# Patient Record
Sex: FEMALE | Race: WHITE | NOT HISPANIC OR LATINO | Employment: OTHER | URBAN - METROPOLITAN AREA
[De-identification: names, ages, dates, MRNs, and addresses within clinical notes are randomized per-mention and may not be internally consistent; named-entity substitution may affect disease eponyms.]

---

## 2017-01-04 ENCOUNTER — ROUTINE PRENATAL (OUTPATIENT)
Dept: OBGYN | Facility: CLINIC | Age: 22
End: 2017-01-04
Payer: COMMERCIAL

## 2017-01-04 VITALS — BODY MASS INDEX: 30.65 KG/M2 | SYSTOLIC BLOOD PRESSURE: 110 MMHG | WEIGHT: 173 LBS | DIASTOLIC BLOOD PRESSURE: 70 MMHG

## 2017-01-04 DIAGNOSIS — O34.219 PREVIOUS CESAREAN DELIVERY, ANTEPARTUM: ICD-10-CM

## 2017-01-04 PROCEDURE — 90040 PR PRENATAL FOLLOW UP: CPT | Performed by: OBSTETRICS & GYNECOLOGY

## 2017-01-04 NOTE — MR AVS SNAPSHOT
Rowan Leos   2017 1:00 PM   Routine Prenatal   MRN: [id number]    Department:  [facility]   Dept Phone:  [phone]    Description:  Female : 1995   Provider:  Linda Messer M.D.           Allergies as of 2017     Allergen Noted Reactions    Ortho Tri-Cyclen [Norgestimate-Ethinyl Estradiol] 2010   Anaphylaxis    Ortho tri-cyclen lo      You were diagnosed with     Previous  delivery, antepartum   [[id number]]         Vital Signs     Blood Pressure Weight Smoking Status             110/70 mmHg 78.472 kg (173 lb) Former Smoker         Basic Information     Date Of Birth Sex Race Ethnicity Preferred Language    1995 Female White Non- English      Your appointments     2017  1:00 PM   OB Follow Up with Linda Messer M.D.   Formerly [facility] ([address])    [address] [place] [address] [id number] [phone]            2017  1:00 PM   OB Follow Up with Linda Messer M.D.   Formerly [facility] ([address])    [address] [place] [id number] [phone]            2017  1:00 PM   OB Follow Up with Linda Messer M.D.   Former[facility] ([address])    [address] [place] [address] [phone]            2017  1:00 PM   OB Follow Up with Linda Messer M.D.   Former[facility] ([address])    [address] [place] [id number] [phone]            Mar 03, 2017  1:00 PM   OB Follow Up with Linda Messer M.D.   Former[facility] ([address])    [address] [place] [address], [address] [place] [address] [id number] [phone]              Problem List              ICD-10-CM Priority Class Noted - Resolved    Fibromyalgia M79.7   9/3/2014 - Present    Arthritis M19.90   9/3/2014 - Present    Hx of migraine headaches Z86.69   9/3/2014 - Present    Previous  delivery,  antepartum O34.219   10/17/2016 - Present      Health Maintenance        Date Due Completion Dates    IMM HEP B VACCINE (1 of 3 - Primary Series) 1995 ---    IMM HEP A VACCINE (1 of 2 - Standard Series) 7/30/1994 ---    IMM HPV VACCINE (1 of 3 - Female 3 Dose Series) 7/30/2004 ---    IMM VARICELLA (CHICKENPOX) VACCINE (1 of 2 - 2 Dose Adolescent Series) 7/30/2006 ---    IMM INFLUENZA (1) 9/1/2016 ---    PAP SMEAR 3/25/2019 3/25/2016, 3/25/2016 (Done), 2/7/2014    Override on 3/25/2016: Done    IMM DTaP/Tdap/Td Vaccine (3 - Td) 12/13/2026 12/13/2016, 9/14/2014            Current Immunizations     MMR/Varicella Combined Vaccine  Incomplete    Tdap Vaccine 12/13/2016,  Incomplete, 9/14/2014 11:30 PM      Below and/or attached are the medications your provider expects you to take. Review all of your home medications and newly ordered medications with your provider and/or pharmacist. Follow medication instructions as directed by your provider and/or pharmacist. Please keep your medication list with you and share with your provider. Update the information when medications are discontinued, doses are changed, or new medications (including over-the-counter products) are added; and carry medication information at all times in the event of emergency situations     Allergies:  ORTHO TRI-CYCLEN - Anaphylaxis               Medications  Valid as of: January 04, 2017 -  1:17 PM    Generic Name Brand Name Tablet Size Instructions for use    Docosahexaenoic Acid   Take  by mouth.        Docusate Sodium (Cap)  MG Take 100 mg by mouth 2 times a day as needed for Constipation.        Ibuprofen (Tab) MOTRIN 800 MG Take 1 Tab by mouth every 8 hours as needed (Cramping).        Norethindrone-Eth Estradiol (Tab) ORTHO-NOVUM 1-35 TAB, NORTREL 1-35 TAB 1-35 MG-MCG Take 1 Tab by mouth every day.        Oxycodone-Acetaminophen (Tab) PERCOCET 5-325 MG Take 1 Tab by mouth every four hours as needed for Moderate Pain ((Pain Scale  4-6)).        Prenatal MV-Min-Fe Fum-FA-DHA   Take  by mouth.        Simethicone (Chew Tab) MYLICON 80 MG Take 1 Tab by mouth 4 times a day as needed (Abdominal Distention).        .                 Medicines prescribed today were sent to:     [id number] [place], [place] - [address]    [address] [facility] [place] [address]    Phone: [phone] Fax: [phone]    Open 24 Hours?: No      Medication refill instructions:       If your prescription bottle indicates you have medication refills left, it is not necessary to call your provider’s office. Please contact your pharmacy and they will refill your medication.    If your prescription bottle indicates you do not have any refills left, you may request refills at any time through one of the following ways: The online Tribzi system (except Urgent Care), by calling your provider’s office, or by asking your pharmacy to contact your provider’s office with a refill request. Medication refills are processed only during regular business hours and may not be available until the next business day. Your provider may request additional information or to have a follow-up visit with you prior to refilling your medication.   *Please Note: Medication refills are assigned a new Rx number when refilled electronically. Your pharmacy may indicate that no refills were authorized even though a new prescription for the same medication is available at the pharmacy. Please request the medicine by name with the pharmacy before contacting your provider for a refill.           Tribzi Access Code: [email or web address]  Expires: 2/3/2017  1:17 PM    Tribzi  A secure, online tool to manage your health information     [employer]’s Tribzi® is a secure, online tool that connects you to your personalized health information from the privacy of your home -- day or night - making it very easy for you to manage your healthcare. Once the activation process is completed, you can even  access your medical information using the [employer] damian, which is available for free in the Apple Damian store or Google Play store.     [employer] provides the following levels of access (as shown below):   My Chart Features   [facility] Primary [facility] Doctor [facility]  Specialists [facility]  Urgent  Care Non-[facility]  Primary Care  Doctor   Email your healthcare team securely and privately 24/7 X X X    Manage appointments: schedule your next appointment; view details of past/upcoming appointments X      Request prescription refills. X      View recent personal medical records, including lab and immunizations X X X X   View health record, including health history, allergies, medications X X X X   Read reports about your outpatient visits, procedures, consult and ER notes X X X X   See your discharge summary, which is a recap of your hospital and/or ER visit that includes your diagnosis, lab results, and care plan. X X       How to register for [employer]:  1. Go to  [email or web address].  2. Click on the Sign Up Now box, which takes you to the New Member Sign Up page. You will need to provide the following information:  a. Enter your [employer] Access Code exactly as it appears at the top of this page. (You will not need to use this code after you’ve completed the sign-up process. If you do not sign up before the expiration date, you must request a new code.)   b. Enter your date of birth.   c. Enter your home email address.   d. Click Submit, and follow the next screen’s instructions.  3. Create a [employer] ID. This will be your [employer] login ID and cannot be changed, so think of one that is secure and easy to remember.  4. Create a [employer] password. You can change your password at any time.  5. Enter your Password Reset Question and Answer. This can be used at a later time if you forget your password.   6. Enter your e-mail address. This allows you to receive e-mail notifications when new information is available in [employer].  7. Click  Sign Up. You can now view your health information.    For assistance activating your [employer] account, call [phone]

## 2017-01-04 NOTE — PROGRESS NOTES
21 y.o.  31w6d The patient is here for routine obstetrical followup. She is without complaints and reports good fetal movement. She denies contractions, vaginal bleeding, or leaking of fluid.  She has recently recovered from the flu.     The patient's pregnancy is complicated by   Patient Active Problem List    Diagnosis Date Noted   • Previous  delivery, antepartum 10/17/2016   • Fibromyalgia 2014   • Arthritis 2014   • Hx of migraine headaches 2014     Did not do 28 week labs - urged compliance.     Followup in 2 weeks   labor precautions were discussed with patient  Fetal kick counts were discussed with patient

## 2017-01-06 ENCOUNTER — HOSPITAL ENCOUNTER (OUTPATIENT)
Dept: LAB | Facility: MEDICAL CENTER | Age: 22
End: 2017-01-06
Attending: OBSTETRICS & GYNECOLOGY
Payer: COMMERCIAL

## 2017-01-06 DIAGNOSIS — O09.622 HIGH RISK PREGNANCY IN YOUNG MULTIGRAVIDA, SECOND TRIMESTER: ICD-10-CM

## 2017-01-06 LAB — GLUCOSE 1H P 50 G GLC PO SERPL-MCNC: 129 MG/DL (ref 70–139)

## 2017-01-06 PROCEDURE — 36415 COLL VENOUS BLD VENIPUNCTURE: CPT

## 2017-01-06 PROCEDURE — 82950 GLUCOSE TEST: CPT

## 2017-01-19 ENCOUNTER — ROUTINE PRENATAL (OUTPATIENT)
Dept: OBGYN | Facility: CLINIC | Age: 22
End: 2017-01-19
Payer: COMMERCIAL

## 2017-01-19 VITALS — SYSTOLIC BLOOD PRESSURE: 110 MMHG | BODY MASS INDEX: 31.54 KG/M2 | DIASTOLIC BLOOD PRESSURE: 70 MMHG | WEIGHT: 178 LBS

## 2017-01-19 DIAGNOSIS — O34.219 PREVIOUS CESAREAN DELIVERY, ANTEPARTUM: ICD-10-CM

## 2017-01-19 PROCEDURE — 90040 PR PRENATAL FOLLOW UP: CPT | Performed by: OBSTETRICS & GYNECOLOGY

## 2017-01-19 NOTE — PROGRESS NOTES
21 y.o.  34w0d The patient is here for routine obstetrical followup. She is without complaints and reports good fetal movement. She denies contractions, vaginal bleeding, or leaking of fluid.    The patient's pregnancy is complicated by   Patient Active Problem List    Diagnosis Date Noted   • Previous  delivery, antepartum 10/17/2016   • Fibromyalgia 2014   • Arthritis 2014   • Hx of migraine headaches 2014     28 wk labs normal.  CF testing normal.    Reviewed policy for , that we would allow her to go into labor spontaneously until 40 wks, then schedule repeat C/S if no spontaneous labor.  She is resistant to this.  I explained the risk of failed  after 40 wks and increased risk of uterine rupture.      Followup in 2 weeks  Labor precautions were discussed with patient  Fetal kick counts were discussed with patient

## 2017-01-19 NOTE — MR AVS SNAPSHOT
Rowan Leos   2017 1:00 PM   Routine Prenatal   MRN: [id number]    Department:  [facility]   Dept Phone:  [phone]    Description:  Female : 1995   Provider:  Linda Messer M.D.           Allergies as of 2017     Allergen Noted Reactions    Ortho Tri-Cyclen [Norgestimate-Ethinyl Estradiol] 2010   Anaphylaxis    Ortho tri-cyclen lo      Vital Signs     Blood Pressure Weight Smoking Status             110/70 mmHg 80.74 kg (178 lb) Former Smoker         Basic Information     Date Of Birth Sex Race Ethnicity Preferred Language    1995 Female White Non- English      Your appointments     2017  1:00 PM   OB Follow Up with Linda Messer M.D.   [address])    [address] [place] [id number] [phone]            2017  1:00 PM   OB Follow Up with Linda Messer M.D.   [facility] ([address])    [address] [place] [phone]            2017  1:00 PM   OB Follow Up with Linda Messer M.D.   [facility] ([address])    [address] [place] [phone]            Mar 03, 2017  1:00 PM   OB Follow Up with Linda Messer M.D.   [address] [phone]              Problem List              ICD-10-CM Priority Class Noted - Resolved    Fibromyalgia M79.7   9/3/2014 - Present    Arthritis M19.90   9/3/2014 - Present    Hx of migraine headaches Z86.69   9/3/2014 - Present    Previous  delivery, antepartum O34.219   10/17/2016 - Present      Health Maintenance        Date Due Completion Dates    IMM HEP B VACCINE (1 of 3 - Primary Series) 1995 ---    IMM HEP A VACCINE (1 of 2 - Standard Series) 1994 ---    IMM HPV VACCINE (1 of 3 - Female 3 Dose Series) 2004 ---      IMM VARICELLA (CHICKENPOX) VACCINE (1 of 2 - 2 Dose Adolescent Series) 7/30/2006 ---    IMM INFLUENZA (1) 9/1/2016 ---    PAP SMEAR 3/25/2019 3/25/2016, 3/25/2016 (Done), 2/7/2014    Override on 3/25/2016: Done    IMM DTaP/Tdap/Td Vaccine (3 - Td) 12/13/2026 12/13/2016, 9/14/2014            Current Immunizations     MMR/Varicella Combined Vaccine  Incomplete    Tdap Vaccine 12/13/2016,  Incomplete, 9/14/2014 11:30 PM      Below and/or attached are the medications your provider expects you to take. Review all of your home medications and newly ordered medications with your provider and/or pharmacist. Follow medication instructions as directed by your provider and/or pharmacist. Please keep your medication list with you and share with your provider. Update the information when medications are discontinued, doses are changed, or new medications (including over-the-counter products) are added; and carry medication information at all times in the event of emergency situations     Allergies:  ORTHO TRI-CYCLEN - Anaphylaxis               Medications  Valid as of: January 19, 2017 -  1:19 PM    Generic Name Brand Name Tablet Size Instructions for use    Docosahexaenoic Acid   Take  by mouth.        Docusate Sodium (Cap)  MG Take 100 mg by mouth 2 times a day as needed for Constipation.        Ibuprofen (Tab) MOTRIN 800 MG Take 1 Tab by mouth every 8 hours as needed (Cramping).        Norethindrone-Eth Estradiol (Tab) ORTHO-NOVUM 1-35 TAB, NORTREL 1-35 TAB 1-35 MG-MCG Take 1 Tab by mouth every day.        Oxycodone-Acetaminophen (Tab) PERCOCET 5-325 MG Take 1 Tab by mouth every four hours as needed for Moderate Pain ((Pain Scale 4-6)).        Prenatal MV-Min-Fe Fum-FA-DHA   Take  by mouth.        Simethicone (Chew Tab) MYLICON 80 MG Take 1 Tab by mouth 4 times a day as needed (Abdominal Distention).        .                 Medicines prescribed today were sent to:     [id number] [place] - [address]    [address] [place] [address]    Phone: [phone] Fax: [phone]    Open 24 Hours?: No      Medication refill instructions:       If your prescription bottle indicates you have medication refills left, it is not necessary to call your provider’s office. Please contact your pharmacy and they will refill your medication.    If your prescription bottle indicates you do not have any refills left, you may request refills at any time through one of the following ways: The online Harimata system (except Urgent Care), by calling your provider’s office, or by asking your pharmacy to contact your provider’s office with a refill request. Medication refills are processed only during regular business hours and may not be available until the next business day. Your provider may request additional information or to have a follow-up visit with you prior to refilling your medication.   *Please Note: Medication refills are assigned a new Rx number when refilled electronically. Your pharmacy may indicate that no refills were authorized even though a new prescription for the same medication is available at the pharmacy. Please request the medicine by name with the pharmacy before contacting your provider for a refill.           Harimata Access Code: [email or web address]  Expires: 2/3/2017  1:17 PM    Harimata  A secure, online tool to manage your health information     [employer]’s Harimata® is a secure, online tool that connects you to your personalized health information from the privacy of your home -- day or night - making it very easy for you to manage your healthcare. Once the activation process is completed, you can even access your medical information using the Harimata damian, which is available for free in the Apple Damian store or Google Play store.     Harimata provides the following levels of access (as shown below):   My Chart Features   [facility] Primary [facility] Doctor [facility]  Specialists [facility]  Urgent  Care  [facility]  Primary Care  Doctor   Email your healthcare team securely and privately 24/7 X X X    Manage appointments: schedule your next appointment; view details of past/upcoming appointments X      Request prescription refills. X      View recent personal medical records, including lab and immunizations X X X X   View health record, including health history, allergies, medications X X X X   Read reports about your outpatient visits, procedures, consult and ER notes X X X X   See your discharge summary, which is a recap of your hospital and/or ER visit that includes your diagnosis, lab results, and care plan. X X       How to register for crealytics:  1. Go to  [email or web address].  2. Click on the Sign Up Now box, which takes you to the New Member Sign Up page. You will need to provide the following information:  a. Enter your crealytics Access Code exactly as it appears at the top of this page. (You will not need to use this code after you’ve completed the sign-up process. If you do not sign up before the expiration date, you must request a new code.)   b. Enter your date of birth.   c. Enter your home email address.   d. Click Submit, and follow the next screen’s instructions.  3. Create a crealytics ID. This will be your crealytics login ID and cannot be changed, so think of one that is secure and easy to remember.  4. Create a crealytics password. You can change your password at any time.  5. Enter your Password Reset Question and Answer. This can be used at a later time if you forget your password.   6. Enter your e-mail address. This allows you to receive e-mail notifications when new information is available in crealytics.  7. Click Sign Up. You can now view your health information.    For assistance activating your crealytics account, call [phone]

## 2017-02-01 ENCOUNTER — ROUTINE PRENATAL (OUTPATIENT)
Dept: OBGYN | Facility: CLINIC | Age: 22
End: 2017-02-01
Payer: COMMERCIAL

## 2017-02-01 ENCOUNTER — HOSPITAL ENCOUNTER (OUTPATIENT)
Facility: MEDICAL CENTER | Age: 22
End: 2017-02-01
Attending: OBSTETRICS & GYNECOLOGY
Payer: COMMERCIAL

## 2017-02-01 VITALS — WEIGHT: 179 LBS | BODY MASS INDEX: 31.72 KG/M2 | SYSTOLIC BLOOD PRESSURE: 112 MMHG | DIASTOLIC BLOOD PRESSURE: 68 MMHG

## 2017-02-01 DIAGNOSIS — Z34.83 PRENATAL CARE, SUBSEQUENT PREGNANCY, THIRD TRIMESTER: ICD-10-CM

## 2017-02-01 PROCEDURE — 90040 PR PRENATAL FOLLOW UP: CPT | Performed by: OBSTETRICS & GYNECOLOGY

## 2017-02-01 PROCEDURE — 87653 STREP B DNA AMP PROBE: CPT

## 2017-02-01 NOTE — PROGRESS NOTES
Patient is at 35w6d .no complaints  Fetal Movement : positive       Patients' weight gain, fluid intake and exercise level discussed.Vitals, fundal height , fetal position, and FHR reviewed on flowsheet.    .../68 mmHg  Wt 81.194 kg (179 lb)  Past Medical History   Diagnosis Date   • Fibromyalgia    • Arthritis    • Headache, classical migraine    • Migraines      Patient Active Problem List    Diagnosis Date Noted   • Previous  delivery, antepartum 10/17/2016   • Fibromyalgia 2014   • Arthritis 2014   • Hx of migraine headaches 2014         Lab:No results found for this or any previous visit (from the past 336 hour(s)).  Review of Op note: 5cm , late decelerations , Low transverse , single layer only   Assessment:  1  35w6d  2. . Doing well  3. Size equals Dates and/or Scan  4. Weight gain: normal: Yes, excessive:No  5. Total gain at 29 lbs                         Plan.  1. Rediscuss diet.  2. Increase water intake PRN  3. Continue vitamins.  4. Kick counts as instructed.  5. Discuss support hose and proper shoe wear as indicated.  Extensive discussionregarding policy for TOL[employer]. Qualifications , and limitations. Risks and benefits as well discussed. Explained need to set standards , specifically , that patients whom are distant fromdelivery at time of NICK, will be required to have Repat C/S and reasoning behind this . Patient made aware that significant cervical change , signs of labor , etc, will allow few days [address] PRN   PAtient seem edf to understand this

## 2017-02-01 NOTE — MR AVS SNAPSHOT
Bettinajoe WILKES Deric   2017 1:45 PM   Routine Prenatal   MRN: [id number]    Department:  [facility]   Dept Phone:  [phone]    Description:  Female : 1995   Provider:  Chemo Ndiaye M.D.           Allergies as of 2017     Allergen Noted Reactions    Ortho Tri-Cyclen [Norgestimate-Ethinyl Estradiol] 2010   Anaphylaxis    Ortho tri-cyclen lo      You were diagnosed with     Prenatal care, subsequent pregnancy, third trimester   [107415]         Vital Signs     Blood Pressure Weight Smoking Status             112/68 mmHg 81.194 kg (179 lb) Former Smoker         Basic Information     Date Of Birth Sex Race Ethnicity Preferred Language    1995 Female White Non- English      Your appointments     Feb 10, 2017  3:15 PM   OB Follow Up with Chemo Ndiaye M.D.   [facility] ([address])    [address] [place] [address], [address] [address] [phone]            2017  4:15 PM   OB Follow Up with Chemo Ndiaye M.D.   [facility] ([address])    [address] [place] [address], [address] [place] [address] [phone]            2017  1:45 PM   OB Follow Up with Chemo Ndiaye M.D.   [facility] ([address])    [address] [place] [address], [address] [address] [phone]            Mar 01, 2017  1:45 PM   OB Follow Up with Chemo Ndiaye M.D.   [facility] ([address])    [address] [place] [address], [address] [place] [address] [phone]              Problem List              ICD-10-CM Priority Class Noted - Resolved    Fibromyalgia M79.7   9/3/2014 - Present    Arthritis M19.90   9/3/2014 - Present    Hx of migraine headaches Z86.69   9/3/2014 - Present    Previous  delivery, antepartum O34.219   10/17/2016 - Present      Health Maintenance        Date Due Completion Dates    IMM HEP B VACCINE (1 of 3 - Primary Series) 1995 ---    IMM HEP A  VACCINE (1 of 2 - Standard Series) 7/30/1994 ---    IMM HPV VACCINE (1 of 3 - Female 3 Dose Series) 7/30/2004 ---    IMM VARICELLA (CHICKENPOX) VACCINE (1 of 2 - 2 Dose Adolescent Series) 7/30/2006 ---    IMM INFLUENZA (1) 9/1/2016 ---    PAP SMEAR 3/25/2019 3/25/2016, 3/25/2016 (Done), 2/7/2014    Override on 3/25/2016: Done    IMM DTaP/Tdap/Td Vaccine (3 - Td) 12/13/2026 12/13/2016, 9/14/2014            Current Immunizations     MMR/Varicella Combined Vaccine  Incomplete    Tdap Vaccine 12/13/2016,  Incomplete, 9/14/2014 11:30 PM      Below and/or attached are the medications your provider expects you to take. Review all of your home medications and newly ordered medications with your provider and/or pharmacist. Follow medication instructions as directed by your provider and/or pharmacist. Please keep your medication list with you and share with your provider. Update the information when medications are discontinued, doses are changed, or new medications (including over-the-counter products) are added; and carry medication information at all times in the event of emergency situations     Allergies:  ORTHO TRI-CYCLEN - Anaphylaxis               Medications  Valid as of: February 01, 2017 -  2:22 PM    Generic Name Brand Name Tablet Size Instructions for use    Docosahexaenoic Acid   Take  by mouth.        Docusate Sodium (Cap)  MG Take 100 mg by mouth 2 times a day as needed for Constipation.        Ibuprofen (Tab) MOTRIN 800 MG Take 1 Tab by mouth every 8 hours as needed (Cramping).        Norethindrone-Eth Estradiol (Tab) ORTHO-NOVUM 1-35 TAB, NORTREL 1-35 TAB 1-35 MG-MCG Take 1 Tab by mouth every day.        Oxycodone-Acetaminophen (Tab) PERCOCET 5-325 MG Take 1 Tab by mouth every four hours as needed for Moderate Pain ((Pain Scale 4-6)).        Prenatal MV-Min-Fe Fum-FA-DHA   Take  by mouth.        Simethicone (Chew Tab) MYLICON 80 MG Take 1 Tab by mouth 4 times a day as needed (Abdominal Distention).         .                 Medicines prescribed today were sent to:     [id number] [place] [facility] [place], [place] - [address]    [address] [place] [address]    Phone: [phone] Fax: [phone]    Open 24 Hours?: No      Medication refill instructions:       If your prescription bottle indicates you have medication refills left, it is not necessary to call your provider’s office. Please contact your pharmacy and they will refill your medication.    If your prescription bottle indicates you do not have any refills left, you may request refills at any time through one of the following ways: The online Ohai system (except Urgent Care), by calling your provider’s office, or by asking your pharmacy to contact your provider’s office with a refill request. Medication refills are processed only during regular business hours and may not be available until the next business day. Your provider may request additional information or to have a follow-up visit with you prior to refilling your medication.   *Please Note: Medication refills are assigned a new Rx number when refilled electronically. Your pharmacy may indicate that no refills were authorized even though a new prescription for the same medication is available at the pharmacy. Please request the medicine by name with the pharmacy before contacting your provider for a refill.        Your To Do List     Future Labs/Procedures Complete By Expires    GRP B STREP, BY PCR (MIXON BROTH)  As directed 2018      Other Notes About Your Plan     Sees Acupuncturist for Fibromyalgia   Review of Op note: 5cm , late decelerations , Low transverse , single layer only   Desires TOLAC           Ohai Access Code: [email or web address]  Expires: 2/3/2017  1:17 PM    Ohai  A secure, online tool to manage your health information     [employer]’s Ohai® is a secure, online tool that connects you to your personalized health information from the privacy of your home -- day or  night - making it very easy for you to manage your healthcare. Once the activation process is completed, you can even access your medical information using the Holaira damian, which is available for free in the Apple Damian store or Google Play store.     Holaira provides the following levels of access (as shown below):   My Chart Features   [facility] Doctor [facility]  Specialists [facility]  Urgent  Care Non-[facility]  Primary Care  Doctor   Email your healthcare team securely and privately 24/7 X X X    Manage appointments: schedule your next appointment; view details of past/upcoming appointments X      Request prescription refills. X      View recent personal medical records, including lab and immunizations X X X X   View health record, including health history, allergies, medications X X X X   Read reports about your outpatient visits, procedures, consult and ER notes X X X X   See your discharge summary, which is a recap of your hospital and/or ER visit that includes your diagnosis, lab results, and care plan. X X       How to register for Holaira:  1. Go to  [email or web address].  2. Click on the Sign Up Now box, which takes you to the New Member Sign Up page. You will need to provide the following information:  a. Enter your Holaira Access Code exactly as it appears at the top of this page. (You will not need to use this code after you’ve completed the sign-up process. If you do not sign up before the expiration date, you must request a new code.)   b. Enter your date of birth.   c. Enter your home email address.   d. Click Submit, and follow the next screen’s instructions.  3. Create a Holaira ID. This will be your Holaira login ID and cannot be changed, so think of one that is secure and easy to remember.  4. Create a Holaira password. You can change your password at any time.  5. Enter your Password Reset Question and Answer. This can be used at a later time if you forget your password.   6. Enter your  e-mail address. This allows you to receive e-mail notifications when new information is available in [employer].  7. Click Sign Up. You can now view your health information.    For assistance activating your [employer] account, call [phone]

## 2017-02-02 LAB — GP B STREP DNA SPEC QL NAA+PROBE: NEGATIVE

## 2017-02-10 ENCOUNTER — ROUTINE PRENATAL (OUTPATIENT)
Dept: OBGYN | Facility: CLINIC | Age: 22
End: 2017-02-10
Payer: COMMERCIAL

## 2017-02-10 VITALS — DIASTOLIC BLOOD PRESSURE: 72 MMHG | SYSTOLIC BLOOD PRESSURE: 114 MMHG | WEIGHT: 178 LBS | BODY MASS INDEX: 31.54 KG/M2

## 2017-02-10 DIAGNOSIS — O34.219 PREVIOUS CESAREAN DELIVERY, ANTEPARTUM: ICD-10-CM

## 2017-02-10 PROCEDURE — 90040 PR PRENATAL FOLLOW UP: CPT | Performed by: OBSTETRICS & GYNECOLOGY

## 2017-02-10 NOTE — PROGRESS NOTES
OB Follow Up--- High Risk  Prev C/s for TOLAC .          21 y.o. is a 37w1d presents in prenatal follow up.    Subjective:no complaints  . Fetal Movement  positive    Problem List:has Fibromyalgia; Arthritis; Hx of migraine headaches; and Previous  delivery, antepartum on her problem list.     Objective:   /72 mmHg  Wt 80.74 kg (178 lb)    Abdomen:  S=D  Extremities:trace        Lab:  Recent Results (from the past 336 hour(s))   GRP B STREP, BY PCR (MIXON BROTH)    Collection Time: 17  2:43 PM   Result Value Ref Range    Strep Gp B DNA PCR Negative Negative         Assessment:    37w1d     Prev C/s for KUN C  Early pregnancy warning signs (bleeding,pain,discharge,leaking) discussed.    Plan:  1 week Need to check pelvis  Continue water intake  Ambulate nightly after 37 weeks  Continue Kick counts

## 2017-02-14 ENCOUNTER — ROUTINE PRENATAL (OUTPATIENT)
Dept: OBGYN | Facility: CLINIC | Age: 22
End: 2017-02-14
Payer: COMMERCIAL

## 2017-02-14 VITALS — BODY MASS INDEX: 31.89 KG/M2 | SYSTOLIC BLOOD PRESSURE: 108 MMHG | DIASTOLIC BLOOD PRESSURE: 70 MMHG | WEIGHT: 180 LBS

## 2017-02-14 DIAGNOSIS — O09.893 SUPERVISION OF OTHER HIGH RISK PREGNANCY, ANTEPARTUM, THIRD TRIMESTER: ICD-10-CM

## 2017-02-14 PROBLEM — O09.899 SUPERVISION OF OTHER HIGH RISK PREGNANCY, ANTEPARTUM: Status: ACTIVE | Noted: 2017-02-14

## 2017-02-14 PROCEDURE — 90040 PR PRENATAL FOLLOW UP: CPT | Performed by: OBSTETRICS & GYNECOLOGY

## 2017-02-14 NOTE — MR AVS SNAPSHOT
Rowan WILKES Deric   2017 3:45 PM   Routine Prenatal   MRN: [id number]    Department[facility]  [facility]   Dept Phone:  [phone]    Description:  Female : 1995   Provider:  Chemo Ndiaye M.D.           Allergies as of 2017     Allergen Noted Reactions    Ortho Tri-Cyclen [Norgestimate-Ethinyl Estradiol] 2010   Anaphylaxis    Ortho tri-cyclen lo      You were diagnosed with     Supervision of other high risk pregnancy, antepartum, third trimester   [1452972]         Vital Signs     Blood Pressure Weight Smoking Status             108/70 mmHg 81.647 kg (180 lb) Former Smoker         Basic Information     Date Of Birth Sex Race Ethnicity Preferred Language    1995 Female White Non- English      Your appointments     2017  1:45 PM   OB Follow Up with Chemo Ndiaye M.D.   [facility] ([address])    [address] [place] [address], [facility] [address]  [facility] [address] [phone]            Mar 01, 2017  1:45 PM   OB Follow Up with Chemo Ndiaye M.D.   [facility] ([address])    [address] [place] [address], [address]  [facility] [address] [phone]              Problem List              ICD-10-CM Priority Class Noted - Resolved    Fibromyalgia M79.7   9/3/2014 - Present    Arthritis M19.90   9/3/2014 - Present    Hx of migraine headaches Z86.69   9/3/2014 - Present    Previous  delivery, antepartum O34.219   10/17/2016 - Present    Supervision of other high risk pregnancy, antepartum O09.899   2017 - Present      Health Maintenance        Date Due Completion Dates    IMM HEP B VACCINE (1 of 3 - Primary Series) 1993 ---    IMM HEP A VACCINE (1 of 2 - Standard Series) 1994 ---    IMM HPV VACCINE (1 of 3 - Female 3 Dose Series) 2004 ---    IMM VARICELLA (CHICKENPOX) VACCINE (1 of 2 - 2 Dose Adolescent Series) 2006 ---    IMM INFLUENZA (1) 2016 ---    PAP SMEAR 3/25/2019 3/25/2016, 3/25/2016 (Done),  2/7/2014    Override on 3/25/2016: Done    IMM DTaP/Tdap/Td Vaccine (3 - Td) 12/13/2026 12/13/2016, 9/14/2014            Current Immunizations     MMR/Varicella Combined Vaccine  Incomplete    Tdap Vaccine 12/13/2016,  Incomplete, 9/14/2014 11:30 PM      Below and/or attached are the medications your provider expects you to take. Review all of your home medications and newly ordered medications with your provider and/or pharmacist. Follow medication instructions as directed by your provider and/or pharmacist. Please keep your medication list with you and share with your provider. Update the information when medications are discontinued, doses are changed, or new medications (including over-the-counter products) are added; and carry medication information at all times in the event of emergency situations     Allergies:  ORTHO TRI-CYCLEN - Anaphylaxis               Medications  Valid as of: February 14, 2017 -  4:24 PM    Generic Name Brand Name Tablet Size Instructions for use    Prenatal MV-Min-Fe Fum-FA-DHA   Take  by mouth.        .                 Medicines prescribed today were sent to:     [facility] [address] [address]    Phone: [phone] Fax: [phone]    Open 24 Hours?: No      Medication refill instructions:       If your prescription bottle indicates you have medication refills left, it is not necessary to call your provider’s office. Please contact your pharmacy and they will refill your medication.    If your prescription bottle indicates you do not have any refills left, you may request refills at any time through one of the following ways: The online shopandsave system (except Urgent Care), by calling your provider’s office, or by asking your pharmacy to contact your provider’s office with a refill request. Medication refills are processed only during regular business hours and may not be available until the next business day. Your provider  may request additional information or to have a follow-up visit with you prior to refilling your medication.   *Please Note: Medication refills are assigned a new Rx number when refilled electronically. Your pharmacy may indicate that no refills were authorized even though a new prescription for the same medication is available at the pharmacy. Please request the medicine by name with the pharmacy before contacting your provider for a refill.        Other Notes About Your Plan     Sees Acupuncturist for Fibromyalgia   Review of Op note: 5cm , late decelerations , Low transverse , single layer only   Desires TOLAC           SixDoors Access Code: [email or web address]  Expires: 3/12/2017  2:39 PM    SixDoors  A secure, online tool to manage your health information     [employer]’s SixDoors® is a secure, online tool that connects you to your personalized health information from the privacy of your home -- day or night - making it very easy for you to manage your healthcare. Once the activation process is completed, you can even access your medical information using the SixDoors damian, which is available for free in the Apple Damian store or Google Play store.     SixDoors provides the following levels of access (as shown below):   My Chart Features   [facility] Doctor [facility]  Urgent  Care Non-[facility]  Primary Care  Doctor   Email your healthcare team securely and privately 24/7 X X X    Manage appointments: schedule your next appointment; view details of past/upcoming appointments X      Request prescription refills. X      View recent personal medical records, including lab and immunizations X X X X   View health record, including health history, allergies, medications X X X X   Read reports about your outpatient visits, procedures, consult and ER notes X X X X   See your discharge summary, which is a recap of your hospital and/or ER visit that includes your diagnosis, lab results, and care plan. X X        How to register for Stretch:  1. Go to  [email or web address].  2. Click on the Sign Up Now box, which takes you to the New Member Sign Up page. You will need to provide the following information:  a. Enter your Stretch Access Code exactly as it appears at the top of this page. (You will not need to use this code after you’ve completed the sign-up process. If you do not sign up before the expiration date, you must request a new code.)   b. Enter your date of birth.   c. Enter your home email address.   d. Click Submit, and follow the next screen’s instructions.  3. Create a [employer]t ID. This will be your Stretch login ID and cannot be changed, so think of one that is secure and easy to remember.  4. Create a [employer]t password. You can change your password at any time.  5. Enter your Password Reset Question and Answer. This can be used at a later time if you forget your password.   6. Enter your e-mail address. This allows you to receive e-mail notifications when new information is available in Stretch.  7. Click Sign Up. You can now view your health information.    For assistance activating your Stretch account, call [phone]

## 2017-02-15 NOTE — PROGRESS NOTES
OB Follow Up--- High Risk  Prev C./S for TOLAC         21 y.o. is a 37w5d presents in prenatal follow up.    Subjective:no complaints  . Fetal Movement  positive    Problem List:has Fibromyalgia; Arthritis; Hx of migraine headaches; and Previous  delivery, antepartum on her problem list.     Objective:   /70 mmHg  Wt 81.647 kg (180 lb)    Abdomen:  S=D  Extremities:Normal        Lab:  Recent Results (from the past 336 hour(s))   GRP B STREP, BY PCR (MIXON BROTH)    Collection Time: 17  2:43 PM   Result Value Ref Range    Strep Gp B DNA PCR Negative Negative         Assessment:    37w5d     Prev C/S for TOLAC   Early pregnancy warning signs (bleeding,pain,discharge,leaking) discussed.    Plan:  1 week  Continue water intake  Ambulate nightly after 37 weeks  Continue Kick counts

## 2017-02-17 ENCOUNTER — TELEPHONE (OUTPATIENT)
Dept: OBGYN | Facility: CLINIC | Age: 22
End: 2017-02-17

## 2017-02-17 NOTE — TELEPHONE ENCOUNTER
Patient called stating that she is 38w1d and is [employer] and [place] and she needs a note stating she cannot drive back and forth. Is this something we can do for her? Please call the patient and let her know.pt aware that she may not receive a call before Tuesday and the pt is okay with that. Number in her chart.

## 2017-02-17 NOTE — Clinical Note
February 22, 2017       Patient: Rowan Leos   YOB: 1995   Date of Visit: 2/17/2017         To Whom It May Concern:    It is my medical opinion that Rowan Leos not to travel at this time during her pregnancy. Patients due date is 03/02/2017.    If you have any questions or concerns, please don't hesitate to call [phone].          Sincerely,          Chemo Ndiaye M.D.  Electronically Signed

## 2017-02-21 NOTE — TELEPHONE ENCOUNTER
Left message for pt to call back   Okay to write letter need to find out if the note needs to be specific for the pt d/t

## 2017-02-24 ENCOUNTER — ROUTINE PRENATAL (OUTPATIENT)
Dept: OBGYN | Facility: CLINIC | Age: 22
End: 2017-02-24
Payer: COMMERCIAL

## 2017-02-24 VITALS — DIASTOLIC BLOOD PRESSURE: 68 MMHG | BODY MASS INDEX: 31.89 KG/M2 | WEIGHT: 180 LBS | SYSTOLIC BLOOD PRESSURE: 110 MMHG

## 2017-02-24 DIAGNOSIS — O09.893 SUPERVISION OF OTHER HIGH RISK PREGNANCY, ANTEPARTUM, THIRD TRIMESTER: ICD-10-CM

## 2017-02-24 PROCEDURE — 90040 PR PRENATAL FOLLOW UP: CPT | Performed by: OBSTETRICS & GYNECOLOGY

## 2017-02-24 NOTE — PROGRESS NOTES
OB Follow Up--- High Risk  Prev C/S for TOLAC   History   Sexual Activity   • Sexual Activity: Not on file       Hx of fibromyalgia       21 y.o. is a 39w1d presents in prenatal follow up.    Subjective:no complaints  . Fetal Movement  positive    Problem List:has Fibromyalgia; Arthritis; Hx of migraine headaches; Previous  delivery, antepartum; and Supervision of other high risk pregnancy, antepartum on her problem list.     Objective:   /68 mmHg  Wt 81.647 kg (180 lb)    Abdomen:  S=D  Extremities:Normal        Lab:No results found for this or any previous visit (from the past 336 hour(s)).      Assessment:    39w1d     Prev C/S   Early pregnancy warning signs (bleeding,pain,discharge,leaking) discussed.    Plan:  1 week  Continue water intake  Ambulate nightly after 37 weeks  Continue Kick counts

## 2017-02-24 NOTE — MR AVS SNAPSHOT
Rowan Leos   2017 1:45 PM   Routine Prenatal   MRN: [id number]    Department:  [facility]   Dept Phone:  [phone]    Description:  Female : 1995   Provider:  Chemo Ndiaye M.D.           Allergies as of 2017     Allergen Noted Reactions    Ortho Tri-Cyclen [Norgestimate-Ethinyl Estradiol] 2010   Anaphylaxis    Ortho tri-cyclen lo      You were diagnosed with     Supervision of other high risk pregnancy, antepartum, third trimester   [5144381]         Vital Signs     Blood Pressure Weight Smoking Status             110/68 mmHg 81.647 kg (180 lb) Former Smoker         Basic Information     Date Of Birth Sex Race Ethnicity Preferred Language    1995 Female White Non- English      Your appointments     Mar 01, 2017  1:45 PM   OB Follow Up with Chemo Ndiaye M.D.   [facility] [address], [address]  [facility] [address] [phone]              Problem List              ICD-10-CM Priority Class Noted - Resolved    Fibromyalgia M79.7   9/3/2014 - Present    Arthritis M19.90   9/3/2014 - Present    Hx of migraine headaches Z86.69   9/3/2014 - Present    Previous  delivery, antepartum O34.219   10/17/2016 - Present    Supervision of other high risk pregnancy, antepartum O09.899   2017 - Present      Health Maintenance        Date Due Completion Dates    IMM HEP B VACCINE (1 of 3 - Primary Series) 1993 ---    IMM HEP A VACCINE (1 of 2 - Standard Series) 1994 ---    IMM HPV VACCINE (1 of 3 - Female 3 Dose Series) 2004 ---    IMM VARICELLA (CHICKENPOX) VACCINE (1 of 2 - 2 Dose Adolescent Series) 2006 ---    IMM INFLUENZA (1) 2016 ---    PAP SMEAR 3/25/2019 3/25/2016, 3/25/2016 (Done), 2014    Override on 3/25/2016: Done    IMM DTaP/Tdap/Td Vaccine (3 - Td) 2026, 2014            Current Immunizations     MMR/Varicella Combined Vaccine  Incomplete    Tdap Vaccine 12/13/2016,  Incomplete, 9/14/2014 11:30 PM      Below and/or attached are the medications your provider expects you to take. Review all of your home medications and newly ordered medications with your provider and/or pharmacist. Follow medication instructions as directed by your provider and/or pharmacist. Please keep your medication list with you and share with your provider. Update the information when medications are discontinued, doses are changed, or new medications (including over-the-counter products) are added; and carry medication information at all times in the event of emergency situations     Allergies:  ORTHO TRI-CYCLEN - Anaphylaxis               Medications  Valid as of: February 24, 2017 -  2:10 PM    Generic Name Brand Name Tablet Size Instructions for use    Prenatal MV-Min-Fe Fum-FA-DHA   Take  by mouth.        .                 Medicines prescribed today [facility] to:     [facility] [place] [facility] [address]    [address] [facility] [address]    Phone: [phone] Fax: [phone]    Open 24 Hours?: No      Medication refill instructions:       If your prescription bottle indicates you have medication refills left, it is not necessary to call your provider’s office. Please contact your pharmacy and they will refill your medication.    If your prescription bottle indicates you do not have any refills left, you may request refills at any time through one of the following ways: The online Navita system (except Urgent Care), by calling your provider’s office, or by asking your pharmacy to contact your provider’s office with a refill request. Medication refills are processed only during regular business hours and may not be available until the next business day. Your provider may request additional information or to have a follow-up visit with you prior to refilling your medication.   *Please Note: Medication refills are assigned a new Rx number when refilled  electronically. Your pharmacy may indicate that no refills were authorized even though a new prescription for the same medication is available at the pharmacy. Please request the medicine by name with the pharmacy before contacting your provider for a refill.        Other Notes About Your Plan     Sees Acupuncturist for Fibromyalgia   Review of Op note: 5cm , late decelerations , Low transverse , single layer only   Desires TOLAC           [employer] Access Code: [email or web address]  Expires: 3/12/2017  2:39 PM    [employer]  A secure, online tool to manage your health information     [employer]’s [employer]® is a secure, online tool that connects you to your personalized health information from the privacy of your home -- day or night - making it very easy for you to manage your healthcare. Once the activation process is completed, you can even access your medical information using the [employer] damian, which is available for free in the Apple Damian store or Google Play store.     [employer] provides the following levels of access (as shown below):   My Chart Features   [facility] Doctor [facility]  Specialists [facility]  Urgent  Care Non-[facility]  Primary Care  Doctor   Email your healthcare team securely and privately 24/7 X X X    Manage appointments: schedule your next appointment; view details of past/upcoming appointments X      Request prescription refills. X      View recent personal medical records, including lab and immunizations X X X X   View health record, including health history, allergies, medications X X X X   Read reports about your outpatient visits, procedures, consult and ER notes X X X X   See your discharge summary, which is a recap of your hospital and/or ER visit that includes your diagnosis, lab results, and care plan. X X       How to register for [employer]:  1. Go to  [email or web address].  2. Click on the Sign Up Now box, which takes you to the New Member Sign Up page. You will need to provide the  following information:  a. Enter your [employer] Access Code exactly as it appears at the top of this page. (You will not need to use this code after you’ve completed the sign-up process. If you do not sign up before the expiration date, you must request a new code.)   b. Enter your date of birth.   c. Enter your home email address.   d. Click Submit, and follow the next screen’s instructions.  3. Create a [employer] ID. This will be your [employer] login ID and cannot be changed, so think of one that is secure and easy to remember.  4. Create a [employer] password. You can change your password at any time.  5. Enter your Password Reset Question and Answer. This can be used at a later time if you forget your password.   6. Enter your e-mail address. This allows you to receive e-mail notifications when new information is available in [employer].  7. Click Sign Up. You can now view your health information.    For assistance activating your [employer] account, call [phone]

## 2017-03-01 ENCOUNTER — ROUTINE PRENATAL (OUTPATIENT)
Dept: OBGYN | Facility: CLINIC | Age: 22
End: 2017-03-01
Payer: COMMERCIAL

## 2017-03-01 VITALS — DIASTOLIC BLOOD PRESSURE: 70 MMHG | WEIGHT: 183 LBS | BODY MASS INDEX: 32.43 KG/M2 | SYSTOLIC BLOOD PRESSURE: 112 MMHG

## 2017-03-01 DIAGNOSIS — O09.893 SUPERVISION OF OTHER HIGH RISK PREGNANCY, ANTEPARTUM, THIRD TRIMESTER: ICD-10-CM

## 2017-03-01 PROCEDURE — 90040 PR PRENATAL FOLLOW UP: CPT | Performed by: OBSTETRICS & GYNECOLOGY

## 2017-03-01 NOTE — MR AVS SNAPSHOT
Rowan Leos   3/1/2017 1:45 PM   Routine Prenatal   MRN: [id number]    Department[facility]  [facility]   Dept Phone:  [phone]    Description:  Female : 1995   Provider:  Chemo Ndiaye M.D.           Allergies as of 3/1/2017     Allergen Noted Reactions    Ortho Tri-Cyclen [Norgestimate-Ethinyl Estradiol] 2010   Anaphylaxis    Ortho tri-cyclen lo      You were diagnosed with     Supervision of other high risk pregnancy, antepartum, third trimester   [[id number]]         Vital Signs     Blood Pressure Weight Smoking Status             112/70 mmHg 83.008 kg (183 lb) Former Smoker         Basic Information     Date Of Birth Sex Race Ethnicity Preferred Language    1995 Female White Non- English      Your appointments     Mar 06, 2017  8:45 AM   OB Follow Up with Chemo Ndiaye M.D.   [facility] [address], [address]  [facility] [address] [phone]              Problem List              ICD-10-CM Priority Class Noted - Resolved    Fibromyalgia M79.7   9/3/2014 - Present    Arthritis M19.90   9/3/2014 - Present    Hx of migraine headaches Z86.69   9/3/2014 - Present    Previous  delivery, antepartum O34.219   10/17/2016 - Present    Supervision of other high risk pregnancy, antepartum O09.899   2017 - Present      Health Maintenance        Date Due Completion Dates    IMM HEP B VACCINE (1 of 3 - Primary Series) 1993 ---    IMM HEP A VACCINE (1 of 2 - Standard Series) 1994 ---    IMM HPV VACCINE (1 of 3 - Female 3 Dose Series) 2004 ---    IMM VARICELLA (CHICKENPOX) VACCINE (1 of 2 - 2 Dose Adolescent Series) 2006 ---    IMM INFLUENZA (1) 2016 ---    PAP SMEAR 3/25/2019 3/25/2016, 3/25/2016 (Done), 2014    Override on 3/25/2016: Done    IMM DTaP/Tdap/Td Vaccine (3 - Td) 2026, 2014            Current Immunizations     MMR/Varicella Combined Vaccine  Incomplete    Tdap Vaccine 12/13/2016,  Incomplete, 9/14/2014 11:30 PM      Below and/or attached are the medications your provider expects you to take. Review all of your home medications and newly ordered medications with your provider and/or pharmacist. Follow medication instructions as directed by your provider and/or pharmacist. Please keep your medication list with you and share with your provider. Update the information when medications are discontinued, doses are changed, or new medications (including over-the-counter products) are added; and carry medication information at all times in the event of emergency situations     Allergies:  ORTHO TRI-CYCLEN - Anaphylaxis               Medications  Valid as of: March 01, 2017 -  2:30 PM    Generic Name Brand Name Tablet Size Instructions for use    Prenatal MV-Min-Fe Fum-FA-DHA   Take  by mouth.        .                 Medicines prescribed today were sent to:     [facility] [place] [facility] [address]    [address] [facility] [address]    Phone: [phone] Fax: [phone]    Open 24 Hours?: No      Medication refill instructions:       If your prescription bottle indicates you have medication refills left, it is not necessary to call your provider’s office. Please contact your pharmacy and they will refill your medication.    If your prescription bottle indicates you do not have any refills left, you may request refills at any time through one of the following ways: The online uBeam system (except Urgent Care), by calling your provider’s office, or by asking your pharmacy to contact your provider’s office with a refill request. Medication refills are processed only during regular business hours and may not be available until the next business day. Your provider may request additional information or to have a follow-up visit with you prior to refilling your medication.   *Please Note: Medication refills are assigned a new Rx number when refilled  electronically. Your pharmacy may indicate that no refills were authorized even though a new prescription for the same medication is available at the pharmacy. Please request the medicine by name with the pharmacy before contacting your provider for a refill.        Other Notes About Your Plan     Sees Acupuncturist for Fibromyalgia   Review of Op note: 5cm , late decelerations , Low transverse , single layer only   Desires TOLAC           [employer] Access Code: [email or web address]  Expires: 3/12/2017  2:39 PM    [employer]  A secure, online tool to manage your health information     [employer]’s In[employer]ab [employer]® is a secure, online tool that connects you to your personalized health information from the privacy of your home -- day or night - making it very easy for you to manage your healthcare. Once the activation process is completed, you can even access your medical information using the In[employer] damian, which is available for free in the Apple Damian store or Google Play store.     [employer] provides the following levels of access (as shown below):   My Chart Features   [facility] Doctor [facility]  Specialists [facility]  Urgent  Care Non-[facility]  Primary Care  Doctor   Email your healthcare team securely and privately 24/7 X X X    Manage appointments: schedule your next appointment; view details of past/upcoming appointments X      Request prescription refills. X      View recent personal medical records, including lab and immunizations X X X X   View health record, including health history, allergies, medications X X X X   Read reports about your outpatient visits, procedures, consult and ER notes X X X X   See your discharge summary, which is a recap of your hospital and/or ER visit that includes your diagnosis, lab results, and care plan. X X       How to register for [employer]:  1. Go to  [email or web address].  2. Click on the Sign Up Now box, which takes you to the New Member Sign Up page. You will need to provide the  following information:  a. Enter your [employer] Access Code exactly as it appears at the top of this page. (You will not need to use this code after you’ve completed the sign-up process. If you do not sign up before the expiration date, you must request a new code.)   b. Enter your date of birth.   c. Enter your home email address.   d. Click Submit, and follow the next screen’s instructions.  3. Create a USEREADY ID. This will be your USEREADY login ID and cannot be changed, so think of one that is secure and easy to remember.  4. Create a USEREADY password. You can change your password at any time.  5. Enter your Password Reset Question and Answer. This can be used at a later time if you forget your password.   6. Enter your e-mail address. This allows you to receive e-mail notifications when new information is available in USEREADY.  7. Click Sign Up. You can now view your health information.    For assistance activating your USEREADY account, call [phone]

## 2017-03-01 NOTE — PROGRESS NOTES
OB Follow Up--- High Risk  PRev C/s for TOLAC          21 y.o. is a 39w6d presents in prenatal follow up.    Subjective:some contractions , still no labor yet   . Fetal Movement  positive    Problem List:has Fibromyalgia; Arthritis; Hx of migraine headaches; Previous  delivery, antepartum; and Supervision of other high risk pregnancy, antepartum on her problem list.     Objective:   /70 mmHg  Wt 83.008 kg (183 lb)    Abdomen:  S=D  Extremities:Normal        Lab:No results found for this or any previous visit (from the past 336 hour(s)).      Assessment:    39w6d     PRev c/s  For TOLAC  Approaching NICK  Early pregnancy warning signs (bleeding,pain,discharge,leaking) discussed.    Plan:  5 days   Patient aware that Repeat C/s , may be needed if no spontaneous labor , cervical change this week , c/w possible prodromal labor  ??   Continue water intake  Ambulate nightly after 37 weeks  Continue Kick counts

## 2017-03-03 ENCOUNTER — HOSPITAL ENCOUNTER (OUTPATIENT)
Facility: MEDICAL CENTER | Age: 22
End: 2017-03-03
Attending: OBSTETRICS & GYNECOLOGY | Admitting: OBSTETRICS & GYNECOLOGY
Payer: COMMERCIAL

## 2017-03-03 VITALS — SYSTOLIC BLOOD PRESSURE: 121 MMHG | TEMPERATURE: 98.3 F | DIASTOLIC BLOOD PRESSURE: 84 MMHG | HEART RATE: 89 BPM

## 2017-03-03 LAB — CRYSTALS AMN MICRO: NORMAL

## 2017-03-03 PROCEDURE — 59025 FETAL NON-STRESS TEST: CPT | Performed by: OBSTETRICS & GYNECOLOGY

## 2017-03-03 PROCEDURE — 89060 EXAM SYNOVIAL FLUID CRYSTALS: CPT

## 2017-03-03 NOTE — PROGRESS NOTES
0548- EDC 3/2/17 40.1 weeks presents to L&D with possible rupture. Pt started having small amount of fluid discharge on 3/1 and has increased in the past two days. TOCO/FM applied. SSE=no gross pooling of fluid noted only yellow mucous. Fern collected. SVE=/-2  0630-Fern negative. Dr. Rodriguez called with update on pt and results of Fern. Orders rcvd to dc pt home with labor precautions.  0635-DC instructions and labor precautions reviewed with pt. Pt ambulated out in stable condition.

## 2017-03-06 ENCOUNTER — ROUTINE PRENATAL (OUTPATIENT)
Dept: OBGYN | Facility: CLINIC | Age: 22
End: 2017-03-06
Payer: COMMERCIAL

## 2017-03-06 VITALS — WEIGHT: 184 LBS | BODY MASS INDEX: 32.6 KG/M2 | DIASTOLIC BLOOD PRESSURE: 68 MMHG | SYSTOLIC BLOOD PRESSURE: 110 MMHG

## 2017-03-06 DIAGNOSIS — Z36.89 FETAL WEIGHT, PREVIOUS LARGE BABY & C-SECTION, ANTEPARTUM, ULTRASOUND: ICD-10-CM

## 2017-03-06 DIAGNOSIS — O09.893 SUPERVISION OF OTHER HIGH RISK PREGNANCY, ANTEPARTUM, THIRD TRIMESTER: ICD-10-CM

## 2017-03-06 DIAGNOSIS — O34.219 FETAL WEIGHT, PREVIOUS LARGE BABY & C-SECTION, ANTEPARTUM, ULTRASOUND: ICD-10-CM

## 2017-03-06 DIAGNOSIS — O48.0 POST-TERM PREGNANCY, 40-42 WEEKS OF GESTATION: ICD-10-CM

## 2017-03-06 LAB
NST ACOUSTIC STIMULATION: NO
NST ACTION NECESSARY: NORMAL
NST ASSESSMENT: REACTIVE
NST BASELINE: 125
NST INDICATIONS: NORMAL
NST OTHER DATA: NORMAL
NST READ BY: NORMAL
NST RETURN: NORMAL
NST UTERINE ACTIVITY: NO

## 2017-03-06 PROCEDURE — 90040 PR PRENATAL FOLLOW UP: CPT | Performed by: OBSTETRICS & GYNECOLOGY

## 2017-03-06 PROCEDURE — 59025 FETAL NON-STRESS TEST: CPT | Performed by: OBSTETRICS & GYNECOLOGY

## 2017-03-06 NOTE — MR AVS SNAPSHOT
Rowan Leos   3/6/2017 9:15 AM   Routine Prenatal   MRN: [id number]    Department:  [facility]   Dept Phone:  [phone]    Description:  Female : 1995   Provider:  KEITH UMANZOR NST ROOM           Allergies as of 3/6/2017     Allergen Noted Reactions    Ortho Tri-Cyclen [Norgestimate-Ethinyl Estradiol] 2010   Anaphylaxis    Ortho tri-cyclen lo      You were diagnosed with     Post-term pregnancy, 40-42 weeks of gestation   [985506]         Vital Signs     Smoking Status                   Former Smoker           Basic Information     Date Of Birth Sex Race Ethnicity Preferred Language    1995 Female White Non- English      Your appointments     Mar 08, 2017  3:00 PM   Fetal Non-Stress Test with KEITH UMANZOR NST ROOM   [facility] ([address])    [address] [place] [address], [address]  [facility] [id number] [phone]              Problem List              ICD-10-CM Priority Class Noted - Resolved    Fibromyalgia M79.7   9/3/2014 - Present    Arthritis M19.90   9/3/2014 - Present    Hx of migraine headaches Z86.69   9/3/2014 - Present    Previous  delivery, antepartum O34.219   10/17/2016 - Present    Supervision of other high risk pregnancy, antepartum O09.899   2017 - Present      Health Maintenance        Date Due Completion Dates    IMM HEP B VACCINE (1 of 3 - Primary Series) 1993 ---    IMM HEP A VACCINE (1 of 2 - Standard Series) 1994 ---    IMM HPV VACCINE (1 of 3 - Female 3 Dose Series) 2004 ---    IMM VARICELLA (CHICKENPOX) VACCINE (1 of 2 - 2 Dose Adolescent Series) 2006 ---    IMM INFLUENZA (1) 2016 ---    PAP SMEAR 3/25/2019 3/25/2016, 3/25/2016 (Done), 2014    Override on 3/25/2016: Done    IMM DTaP/Tdap/Td Vaccine (3 - Td) 2026, 2014            Results       Current Immunizations     MMR/Varicella Combined Vaccine  Incomplete    Tdap Vaccine 2016,  Incomplete, 2014 11:30  PM      Below and/or attached are the medications your provider expects you to take. Review all of your home medications and newly ordered medications with your provider and/or pharmacist. Follow medication instructions as directed by your provider and/or pharmacist. Please keep your medication list with you and share with your provider. Update the information when medications are discontinued, doses are changed, or new medications (including over-the-counter products) are added; and carry medication information at all times in the event of emergency situations     Allergies:  ORTHO TRI-CYCLEN - Anaphylaxis               Medications  Valid as of: March 06, 2017 - 10:01 AM    Generic Name Brand Name Tablet Size Instructions for use    Prenatal MV-Min-Fe Fum-FA-DHA   Take  by mouth.        .                 Medicines prescribed today were sent to:     [facility] [place] [id number] [place] [id number] [facility] [address]    Phone: [phone] Fax: [phone]    Open 24 Hours?: No      Medication refill instructions:       If your prescription bottle indicates you have medication refills left, it is not necessary to call your provider’s office. Please contact your pharmacy and they will refill your medication.    If your prescription bottle indicates you do not have any refills left, you may request refills at any time through one of the following ways: The online UmbaBox system (except Urgent Care), by calling your provider’s office, or by asking your pharmacy to contact your provider’s office with a refill request. Medication refills are processed only during regular business hours and may not be available until the next business day. Your provider may request additional information or to have a follow-up visit with you prior to refilling your medication.   *Please Note: Medication refills are assigned a new Rx number when refilled electronically. Your pharmacy may indicate that no refills  were authorized even though a new prescription for the same medication is available at the pharmacy. Please request the medicine by name with the pharmacy before contacting your provider for a refill.        Other Notes About Your Plan     Sees Acupuncturist for Fibromyalgia   Review of Op note: 5cm , late decelerations , Low transverse , single layer only   Desires TOLAC           [employer] Access Code: [email or web address]  Expires: 3/12/2017  2:39 PM    [employer]  A secure, online tool to manage your health information     [employer]’s S[employer]® is a secure, online tool that connects you to your personalized health information from the privacy of your home -- day or night - making it very easy for you to manage your healthcare. Once the activation process is completed, you can even access your medical information using the [employer] damian, which is available for free in the Apple Damian store or Google Play store.     [employer] provides the following levels of access (as shown below):   My Chart Features   [facility] Primary Care Doctor [facility]  Specialists [facility]  Urgent  Care [facility]  Primary Care  Doctor   Email your healthcare team securely and privately 24/7 X X X    Manage appointments: schedule your next appointment; view details of past/upcoming appointments X      Request prescription refills. X      View recent personal medical records, including lab and immunizations X X X X   View health record, including health history, allergies, medications X X X X   Read reports about your outpatient visits, procedures, consult and ER notes X X X X   See your discharge summary, which is a recap of your hospital and/or ER visit that includes your diagnosis, lab results, and care plan. X X       How to register for [employer]:  1. Go to  [email or web address].  2. Click on the Sign Up Now box, which takes you to the New Member Sign Up page. You will need to provide the following information:  a. Enter your [employer] Access Code  exactly as it appears at the top of this page. (You will not need to use this code after you’ve completed the sign-up process. If you do not sign up before the expiration date, you must request a new code.)   b. Enter your date of birth.   c. Enter your home email address.   d. Click Submit, and follow the next screen’s instructions.  3. Create a veriCAR ID. This will be your veriCAR login ID and cannot be changed, so think of one that is secure and easy to remember.  4. Create a veriCAR password. You can change your password at any time.  5. Enter your Password Reset Question and Answer. This can be used at a later time if you forget your password.   6. Enter your e-mail address. This allows you to receive e-mail notifications when new information is available in veriCAR.  7. Click Sign Up. You can now view your health information.    For assistance activating your veriCAR account, call [phone]

## 2017-03-06 NOTE — MR AVS SNAPSHOT
Rowan WILKES Deric   3/6/2017 8:45 AM   Routine Prenatal   MRN: [id number]    Department:  [facility]   Dept Phone:  [phone]    Description:  Female : 1995   Provider:  Chemo Ndiaye M.D.           Allergies as of 3/6/2017     Allergen Noted Reactions    Ortho Tri-Cyclen [Norgestimate-Ethinyl Estradiol] 2010   Anaphylaxis    Ortho tri-cyclen lo      You were diagnosed with     Supervision of other high risk pregnancy, antepartum, third trimester   [3129277]         Vital Signs     Blood Pressure Weight Smoking Status             110/68 mmHg 83.462 kg (184 lb) Former Smoker         Basic Information     Date Of Birth Sex Race Ethnicity Preferred Language    1995 Female White Non- English      Your appointments     Mar 08, 2017  3:00 PM   Fetal Non-Stress Test with OBGYN E2 NST ROOM   [facility] [address], [address]  [facility] [address] [phone]              Problem List              ICD-10-CM Priority Class Noted - Resolved    Fibromyalgia M79.7   9/3/2014 - Present    Arthritis M19.90   9/3/2014 - Present    Hx of migraine headaches Z86.69   9/3/2014 - Present    Previous  delivery, antepartum O34.219   10/17/2016 - Present    Supervision of other high risk pregnancy, antepartum O09.899   2017 - Present      Health Maintenance        Date Due Completion Dates    IMM HEP B VACCINE (1 of 3 - Primary Series) 1993 ---    IMM HEP A VACCINE (1 of 2 - Standard Series) 1994 ---    IMM HPV VACCINE (1 of 3 - Female 3 Dose Series) 2004 ---    IMM VARICELLA (CHICKENPOX) VACCINE (1 of 2 - 2 Dose Adolescent Series) 2006 ---    IMM INFLUENZA (1) 2016 ---    PAP SMEAR 3/25/2019 3/25/2016, 3/25/2016 (Done), 2014    Override on 3/25/2016: Done    IMM DTaP/Tdap/Td Vaccine (3 - Td) 2026, 2014            Current Immunizations     MMR/Varicella Combined Vaccine  Incomplete     Tdap Vaccine 12/13/2016,  Incomplete, 9/14/2014 11:30 PM      Below and/or attached are the medications your provider expects you to take. Review all of your home medications and newly ordered medications with your provider and/or pharmacist. Follow medication instructions as directed by your provider and/or pharmacist. Please keep your medication list with you and share with your provider. Update the information when medications are discontinued, doses are changed, or new medications (including over-the-counter products) are added; and carry medication information at all times in the event of emergency situations     Allergies:  ORTHO TRI-CYCLEN - Anaphylaxis               Medications  Valid as of: March 06, 2017 - 10:01 AM    Generic Name Brand Name Tablet Size Instructions for use    Prenatal MV-Min-Fe Fum-FA-DHA   Take  by mouth.        .                 Medicines prescribed today were sent to:     [id number] [place] [facility] [address]    [address] [facility] [address]    Phone: [phone] Fax: [phone]    Open 24 Hours?: No      Medication refill instructions:       If your prescription bottle indicates you have medication refills left, it is not necessary to call your provider’s office. Please contact your pharmacy and they will refill your medication.    If your prescription bottle indicates you do not have any refills left, you may request refills at any time through one of the following ways: The online [email or web address] system (except Urgent Care), by calling your provider’s office, or by asking your pharmacy to contact your provider’s office with a refill request. Medication refills are processed only during regular business hours and may not be available until the next business day. Your provider may request additional information or to have a follow-up visit with you prior to refilling your medication.   *Please Note: Medication refills are assigned a new Rx number when refilled  electronically. Your pharmacy may indicate that no refills were authorized even though a new prescription for the same medication is available at the pharmacy. Please request the medicine by name with the pharmacy before contacting your provider for a refill.        Other Notes About Your Plan     Sees Acupuncturist for Fibromyalgia   Review of Op note: 5cm , late decelerations , Low transverse , single layer only   Desires TOLAC           AquaBlok Access Code: [email or web address]  Expires: 3/12/2017  2:39 PM    AquaBlok  A secure, online tool to manage your health information     [employer]’s AquaBlok® is a secure, online tool that connects you to your personalized health information from the privacy of your home -- day or night - making it very easy for you to manage your healthcare. Once the activation process is completed, you can even access your medical information using the AquaBlok damian, which is available for free in the Apple Damian store or Google Play store.     AquaBlok provides the following levels of access (as shown below):   My Chart Features   [facility] Primary Care Doctor [facility]  Specialists [facility]  Urgent  Care Non-[facility]  Primary Care  Doctor   Email your healthcare team securely and privately 24/7 X X X    Manage appointments: schedule your next appointment; view details of past/upcoming appointments X      Request prescription refills. X      View recent personal medical records, including lab and immunizations X X X X   View health record, including health history, allergies, medications X X X X   Read reports about your outpatient visits, procedures, consult and ER notes X X X X   See your discharge summary, which is a recap of your hospital and/or ER visit that includes your diagnosis, lab results, and care plan. X X       How to register for AquaBlok:  1. Go to  [email or web address].  2. Click on the Sign Up Now box, which takes you to the New Member Sign Up page. You will need to provide the  following information:  a. Enter your Peerio Access Code exactly as it appears at the top of this page. (You will not need to use this code after you’ve completed the sign-up process. If you do not sign up before the expiration date, you must request a new code.)   b. Enter your date of birth.   c. Enter your home email address.   d. Click Submit, and follow the next screen’s instructions.  3. Create a Peerio ID. This will be your Peerio login ID and cannot be changed, so think of one that is secure and easy to remember.  4. Create a Peerio password. You can change your password at any time.  5. Enter your Password Reset Question and Answer. This can be used at a later time if you forget your password.   6. Enter your e-mail address. This allows you to receive e-mail notifications when new information is available in Peerio.  7. Click Sign Up. You can now view your health information.    For assistance activating your Peerio account, call [phone]

## 2017-03-06 NOTE — PROGRESS NOTES
OB Follow Up--- High Risk  Prev C/S : still Adamant to try TOLAC           21 y.o. is a 40w4d presents in prenatal follow up.    Subjective:went to hospital at 40.1 weeks and sent home , not alana   . Fetal Movement  positive    Problem List:has Fibromyalgia; Arthritis; Hx of migraine headaches; Previous  delivery, antepartum; and Supervision of other high risk pregnancy, antepartum on her problem list.     Objective:   /68 mmHg  Wt 83.462 kg (184 lb)    Abdomen:  S=D  Extremities:Normal        Lab:  Recent Results (from the past 336 hour(s))   FERN TEST    Collection Time: 17  6:00 AM   Result Value Ref Range    Fern Test On Amniotic Fluid see below Not present   POCT Fetal Nonstress Test    Collection Time: 17  9:46 AM   Result Value Ref Range    NST Indications      NST Baseline      NST Uterine Activity      NST Acoustic Stimulation      NST Assessment      NST Action Necessary      NST Other Data      NST Return      NST Read By       NST : reactive , Baseline 125 , No decelerations     Assessment:    40w4d     Prev C/s ,   Desires TOLAC , but still no labor   Patient aware of and now agrees to Repeat C/S , if no labor     No pain, bleeding, unusual discharge or leeking    Plan:  2 days for NST , and Repat C/S at 41 weeks   Continue water intake  Ambulate nightly after 37 weeks  Continue Kick counts

## 2017-03-08 ENCOUNTER — HOSPITAL ENCOUNTER (OUTPATIENT)
Facility: MEDICAL CENTER | Age: 22
End: 2017-03-08
Attending: OBSTETRICS & GYNECOLOGY | Admitting: OBSTETRICS & GYNECOLOGY
Payer: COMMERCIAL

## 2017-03-08 ENCOUNTER — HOSPITAL ENCOUNTER (INPATIENT)
Facility: MEDICAL CENTER | Age: 22
LOS: 4 days | End: 2017-03-12
Attending: OBSTETRICS & GYNECOLOGY | Admitting: OBSTETRICS & GYNECOLOGY
Payer: COMMERCIAL

## 2017-03-08 ENCOUNTER — ROUTINE PRENATAL (OUTPATIENT)
Dept: OBGYN | Facility: CLINIC | Age: 22
End: 2017-03-08
Payer: COMMERCIAL

## 2017-03-08 VITALS — HEART RATE: 78 BPM | TEMPERATURE: 97.1 F | DIASTOLIC BLOOD PRESSURE: 76 MMHG | SYSTOLIC BLOOD PRESSURE: 120 MMHG

## 2017-03-08 VITALS — SYSTOLIC BLOOD PRESSURE: 114 MMHG | WEIGHT: 186 LBS | BODY MASS INDEX: 32.96 KG/M2 | DIASTOLIC BLOOD PRESSURE: 72 MMHG

## 2017-03-08 DIAGNOSIS — O09.893 SUPERVISION OF OTHER HIGH RISK PREGNANCY, ANTEPARTUM, THIRD TRIMESTER: ICD-10-CM

## 2017-03-08 DIAGNOSIS — O48.0 POST-TERM PREGNANCY, 40-42 WEEKS OF GESTATION: ICD-10-CM

## 2017-03-08 LAB
BASOPHILS # BLD AUTO: 0.1 % (ref 0–1.8)
BASOPHILS # BLD: 0.02 K/UL (ref 0–0.12)
EOSINOPHIL # BLD AUTO: 0.02 K/UL (ref 0–0.51)
EOSINOPHIL NFR BLD: 0.1 % (ref 0–6.9)
ERYTHROCYTE [DISTWIDTH] IN BLOOD BY AUTOMATED COUNT: 46.1 FL (ref 35.9–50)
HCT VFR BLD AUTO: 38.6 % (ref 37–47)
HGB BLD-MCNC: 12.5 G/DL (ref 12–16)
HOLDING TUBE BB 8507: NORMAL
IMM GRANULOCYTES # BLD AUTO: 0.08 K/UL (ref 0–0.11)
IMM GRANULOCYTES NFR BLD AUTO: 0.5 % (ref 0–0.9)
LYMPHOCYTES # BLD AUTO: 2.21 K/UL (ref 1–4.8)
LYMPHOCYTES NFR BLD: 13.2 % (ref 22–41)
MCH RBC QN AUTO: 28.8 PG (ref 27–33)
MCHC RBC AUTO-ENTMCNC: 32.4 G/DL (ref 33.6–35)
MCV RBC AUTO: 88.9 FL (ref 81.4–97.8)
MONOCYTES # BLD AUTO: 0.99 K/UL (ref 0–0.85)
MONOCYTES NFR BLD AUTO: 5.9 % (ref 0–13.4)
NEUTROPHILS # BLD AUTO: 13.4 K/UL (ref 2–7.15)
NEUTROPHILS NFR BLD: 80.2 % (ref 44–72)
NRBC # BLD AUTO: 0 K/UL
NRBC BLD AUTO-RTO: 0 /100 WBC
NST ACOUSTIC STIMULATION: NO
NST ACTION NECESSARY: NORMAL
NST ASSESSMENT: REACTIVE
NST BASELINE: 130
NST INDICATIONS: NORMAL
NST OTHER DATA: NORMAL
NST READ BY: NORMAL
NST RETURN: NORMAL
NST UTERINE ACTIVITY: NO
PLATELET # BLD AUTO: 200 K/UL (ref 164–446)
PMV BLD AUTO: 9.3 FL (ref 9–12.9)
RBC # BLD AUTO: 4.34 M/UL (ref 4.2–5.4)
WBC # BLD AUTO: 16.7 K/UL (ref 4.8–10.8)

## 2017-03-08 PROCEDURE — 700111 HCHG RX REV CODE 636 W/ 250 OVERRIDE (IP): Performed by: OBSTETRICS & GYNECOLOGY

## 2017-03-08 PROCEDURE — 700111 HCHG RX REV CODE 636 W/ 250 OVERRIDE (IP)

## 2017-03-08 PROCEDURE — 85025 COMPLETE CBC W/AUTO DIFF WBC: CPT

## 2017-03-08 PROCEDURE — 59025 FETAL NON-STRESS TEST: CPT | Performed by: OBSTETRICS & GYNECOLOGY

## 2017-03-08 PROCEDURE — 770002 HCHG ROOM/CARE - OB PRIVATE (112)

## 2017-03-08 PROCEDURE — 90040 PR PRENATAL FOLLOW UP: CPT | Performed by: OBSTETRICS & GYNECOLOGY

## 2017-03-08 PROCEDURE — 700101 HCHG RX REV CODE 250

## 2017-03-08 RX ORDER — ALUMINA, MAGNESIA, AND SIMETHICONE 2400; 2400; 240 MG/30ML; MG/30ML; MG/30ML
30 SUSPENSION ORAL EVERY 6 HOURS PRN
Status: DISCONTINUED | OUTPATIENT
Start: 2017-03-08 | End: 2017-03-09 | Stop reason: HOSPADM

## 2017-03-08 RX ORDER — IBUPROFEN 600 MG/1
600 TABLET ORAL EVERY 6 HOURS PRN
Status: CANCELLED | OUTPATIENT
Start: 2017-03-08

## 2017-03-08 RX ORDER — SODIUM CHLORIDE, SODIUM LACTATE, POTASSIUM CHLORIDE, CALCIUM CHLORIDE 600; 310; 30; 20 MG/100ML; MG/100ML; MG/100ML; MG/100ML
INJECTION, SOLUTION INTRAVENOUS CONTINUOUS
Status: DISPENSED | OUTPATIENT
Start: 2017-03-08 | End: 2017-03-09

## 2017-03-08 RX ORDER — SODIUM CHLORIDE, SODIUM LACTATE, POTASSIUM CHLORIDE, CALCIUM CHLORIDE 600; 310; 30; 20 MG/100ML; MG/100ML; MG/100ML; MG/100ML
INJECTION, SOLUTION INTRAVENOUS
Status: COMPLETED
Start: 2017-03-08 | End: 2017-03-08

## 2017-03-08 RX ORDER — ACETAMINOPHEN 325 MG/1
325 TABLET ORAL EVERY 4 HOURS PRN
Status: CANCELLED | OUTPATIENT
Start: 2017-03-08

## 2017-03-08 RX ORDER — MISOPROSTOL 200 UG/1
800 TABLET ORAL
Status: COMPLETED | OUTPATIENT
Start: 2017-03-08 | End: 2017-03-09

## 2017-03-08 RX ORDER — ROPIVACAINE HYDROCHLORIDE 2 MG/ML
INJECTION, SOLUTION EPIDURAL; INFILTRATION; PERINEURAL
Status: COMPLETED
Start: 2017-03-08 | End: 2017-03-08

## 2017-03-08 RX ORDER — OXYCODONE HYDROCHLORIDE AND ACETAMINOPHEN 5; 325 MG/1; MG/1
1 TABLET ORAL EVERY 4 HOURS PRN
Status: CANCELLED | OUTPATIENT
Start: 2017-03-08

## 2017-03-08 RX ORDER — METHYLERGONOVINE MALEATE 0.2 MG/ML
0.2 INJECTION INTRAVENOUS
Status: DISCONTINUED | OUTPATIENT
Start: 2017-03-08 | End: 2017-03-09 | Stop reason: HOSPADM

## 2017-03-08 RX ORDER — OXYCODONE HYDROCHLORIDE 10 MG/1
10 TABLET ORAL EVERY 4 HOURS PRN
Status: CANCELLED | OUTPATIENT
Start: 2017-03-08

## 2017-03-08 RX ADMIN — SODIUM CHLORIDE, POTASSIUM CHLORIDE, SODIUM LACTATE AND CALCIUM CHLORIDE 1000 ML: 600; 310; 30; 20 INJECTION, SOLUTION INTRAVENOUS at 20:37

## 2017-03-08 RX ADMIN — FENTANYL CITRATE 100 MCG: 50 INJECTION, SOLUTION INTRAMUSCULAR; INTRAVENOUS at 20:49

## 2017-03-08 RX ADMIN — ROPIVACAINE HYDROCHLORIDE 10 ML/HR: 2 INJECTION, SOLUTION EPIDURAL; INFILTRATION at 21:21

## 2017-03-08 RX ADMIN — SODIUM CHLORIDE, POTASSIUM CHLORIDE, SODIUM LACTATE AND CALCIUM CHLORIDE: 600; 310; 30; 20 INJECTION, SOLUTION INTRAVENOUS at 21:20

## 2017-03-08 ASSESSMENT — LIFESTYLE VARIABLES
EVER_SMOKED: NEVER
ALCOHOL_USE: NO
EVER_SMOKED: NEVER
DO YOU DRINK ALCOHOL: NO

## 2017-03-08 NOTE — MR AVS SNAPSHOT
Bettinajoe WILKES Deric   3/8/2017 10:45 AM   Routine Prenatal   MRN: [id number]    Department:  [facility]   Dept Phone:  [phone]    Description:  Female : 1995   Provider:  Chemo Ndiaye M.D.           Allergies as of 3/8/2017     Allergen Noted Reactions    Ortho Tri-Cyclen [Norgestimate-Ethinyl Estradiol] 2010   Anaphylaxis    Ortho tri-cyclen lo      You were diagnosed with     Post-term pregnancy, 40-42 weeks of gestation   [166135]         Vital Signs     Smoking Status                   Former Smoker           Basic Information     Date Of Birth Sex Race Ethnicity Preferred Language    1995 Female White Non- English      Problem List              ICD-10-CM Priority Class Noted - Resolved    Fibromyalgia M79.7   9/3/2014 - Present    Arthritis M19.90   9/3/2014 - Present    Hx of migraine headaches Z86.69   9/3/2014 - Present    Previous  delivery, antepartum O34.219   10/17/2016 - Present    Supervision of other high risk pregnancy, antepartum O09.899   2017 - Present      Health Maintenance        Date Due Completion Dates    IMM HEP B VACCINE (1 of 3 - Primary Series) 1993 ---    IMM HEP A VACCINE (1 of 2 - Standard Series) 1994 ---    IMM HPV VACCINE (1 of 3 - Female 3 Dose Series) 2004 ---    IMM VARICELLA (CHICKENPOX) VACCINE (1 of 2 - 2 Dose Adolescent Series) 2006 ---    IMM INFLUENZA (1) 2016 ---    PAP SMEAR 3/25/2019 3/25/2016, 3/25/2016 (Done), 2014    Override on 3/25/2016: Done    IMM DTaP/Tdap/Td Vaccine (3 - Td) 2026, 2014            Results       Current Immunizations     MMR/Varicella Combined Vaccine  Incomplete    Tdap Vaccine 2016,  Incomplete, 2014 11:30 PM      Below and/or attached are the medications your provider expects you to take. Review all of your home medications and newly ordered medications with your provider and/or pharmacist. Follow medication instructions  as directed by your provider and/or pharmacist. Please keep your medication list with you and share with your provider. Update the information when medications are discontinued, doses are changed, or new medications (including over-the-counter products) are added; and carry medication information at all times in the event of emergency situations     Allergies:  ORTHO TRI-CYCLEN - Anaphylaxis               Medications  Valid as of: March 08, 2017 - 11:40 AM    Generic Name Brand Name Tablet Size Instructions for use    Prenatal MV-Min-Fe Fum-FA-DHA   Take  by mouth.        .                 Medicines prescribed today were sent to:     [facility] [address] [place], [place] - [address]    [address] [facility] [place] [address]    Phone: [phone] Fax: [phone]    Open 24 Hours?: No      Medication refill instructions:       If your prescription bottle indicates you have medication refills left, it is not necessary to call your provider’s office. Please contact your pharmacy and they will refill your medication.    If your prescription bottle indicates you do not have any refills left, you may request refills at any time through one of the following ways: The online YouTube system (except Urgent Care), by calling your provider’s office, or by asking your pharmacy to contact your provider’s office with a refill request. Medication refills are processed only during regular business hours and may not be available until the next business day. Your provider may request additional information or to have a follow-up visit with you prior to refilling your medication.   *Please Note: Medication refills are assigned a new Rx number when refilled electronically. Your pharmacy may indicate that no refills were authorized even though a new prescription for the same medication is available at the pharmacy. Please request the medicine by name with the pharmacy before contacting your provider for a refill.        Other Notes  About Your Plan     Sees Acupuncturist for Fibromyalgia   Review of Op note: 5cm , late decelerations , Low transverse , single layer only   Desires TOLAC           [employer] Access Code: [email or web address]  Expires: 3/12/2017  2:39 PM    [employer]  A secure, online tool to manage your health information     [employer]’s [employer]® is a secure, online tool that connects you to your personalized health information from the privacy of your home -- day or night - making it very easy for you to manage your healthcare. Once the activation process is completed, you can even access your medical information using the [employer] damian, which is available for free in the Apple Damian store or Google Play store.     [employer] provides the following levels of access (as shown below):   My Chart Features   [facility] Primary [facility] Doctor [facility]  Specialists [facility]  Urgent  Care Non-[facility]  Primary Care  Doctor   Email your healthcare team securely and privately 24/7 X X X    Manage appointments: schedule your next appointment; view details of past/upcoming appointments X      Request prescription refills. X      View recent personal medical records, including lab and immunizations X X X X   View health record, including health history, allergies, medications X X X X   Read reports about your outpatient visits, procedures, consult and ER notes X X X X   See your discharge summary, which is a recap of your hospital and/or ER visit that includes your diagnosis, lab results, and care plan. X X       How to register for [employer]:  1. Go to  [email or web address].  2. Click on the Sign Up Now box, which takes you to the New Member Sign Up page. You will need to provide the following information:  a. Enter your [employer] Access Code exactly as it appears at the top of this page. (You will not need to use this code after you’ve completed the sign-up process. If you do not sign up before the expiration date, you must request a new code.)   b. Enter your  date of birth.   c. Enter your home email address.   d. Click Submit, and follow the next screen’s instructions.  3. Create a Nanomed [employer] ID. This will be your [employer] login ID and cannot be changed, so think of one that is secure and easy to remember.  4. Create a Quest[employer] Energy System[employer] password. You can change your password at any time.  5. Enter your Password Reset Question and Answer. This can be used at a later time if you forget your password.   6. Enter your e-mail address. This allows you to receive e-mail notifications when new information is available in [employer].  7. Click Sign Up. You can now view your health information.    For assistance activating your [employer] account, call [phone]

## 2017-03-08 NOTE — PROGRESS NOTES
Ob follow up. Good fetal movement. Pt has no complaints. GBS= Negative. C/S on 3/10/17 @ 11:30 am.

## 2017-03-08 NOTE — PROGRESS NOTES
EDC 3/2 40w6d. Pt presents to L&D with complaints of mild contractions every 2-3 mins. Pt has a history of csx1 and has discussed with Dr. Ndiaye the option to TOLAC. TOCO and US applied, VSS. SVE /2. Will updated Dr. Landa on pt status.     005 Dr. Landa updated on pt status, once NST obtained, okay to walk for one hour.     0112 NST obtained, pt off monitor, will recheck in one hour    0220 Pt back on monitor, SVE, no change noted, will updated Dr. Landa.     0225 Orders received to discharge pt home once tracing is reactive.     0250 Reactive tracing obtained, all questions answered. Term labor precautions given and s/s to watch out for and when to return. Pt instructed to follow up with [facility] in the am to see if she needs to present to the office for NST.     0255 Pt off floor in stable condition.

## 2017-03-08 NOTE — IP AVS SNAPSHOT
[employer] Access Code: [email or web address]  Expires: 3/12/2017  3:39 PM    [employer]  A secure, online tool to manage your health information     [employer]’s Trellis Technology® is a secure, online tool that connects you to your personalized health information from the privacy of your home -- day or night - making it very easy for you to manage your healthcare. Once the activation process is completed, you can even access your medical information using the [employer] damian, which is available for free in the Apple Damian store or Google Play store.     [employer] provides the following levels of access (as shown below):   My Chart Features   [facility] Primary Care Doctor [facility]  Specialists [facility]  Urgent  Care Non-[facility]  Primary Care  Doctor   Email your healthcare team securely and privately 24/7 X X X X   Manage appointments: schedule your next appointment; view details of past/upcoming appointments X      Request prescription refills. X      View recent personal medical records, including lab and immunizations X X X X   View health record, including health history, allergies, medications X X X X   Read reports about your outpatient visits, procedures, consult and ER notes X X X X   See your discharge summary, which is a recap of your hospital and/or ER visit that includes your diagnosis, lab results, and care plan. X X       How to register for [employer]:  1. Go to  [email or web address].  2. Click on the Sign Up Now box, which takes you to the New Member Sign Up page. You will need to provide the following information:  a. Enter your [employer] Access Code exactly as it appears at the top of this page. (You will not need to use this code after you’ve completed the sign-up process. If you do not sign up before the expiration date, you must request a new code.)   b. Enter your date of birth.   c. Enter your home email address.   d. Click Submit, and follow the next screen’s instructions.  3. Create a Tre[employer] ID. This will be your [employer]  login ID and cannot be changed, so think of one that is secure and easy to remember.  4. Create a etouches password. You can change your password at any time.  5. Enter your Password Reset Question and Answer. This can be used at a later time if you forget your password.   6. Enter your e-mail address. This allows you to receive e-mail notifications when new information is available in etouches.  7. Click Sign Up. You can now view your health information.    For assistance activating your etouches account, call [phone]

## 2017-03-08 NOTE — MR AVS SNAPSHOT
Bettinajoe WILKES Deric   3/8/2017 9:45 AM   Routine Prenatal   MRN: [id number]    Department:  [facility]   Dept Phone:  [phone]    Description:  Female : 1995   Provider:  Chemo Ndiaye M.D.           Allergies as of 3/8/2017     Allergen Noted Reactions    Ortho Tri-Cyclen [Norgestimate-Ethinyl Estradiol] 2010   Anaphylaxis    Ortho tri-cyclen lo      You were diagnosed with     Supervision of other high risk pregnancy, antepartum, third trimester   [[id number]]         Vital Signs     Blood Pressure Weight Smoking Status             114/72 mmHg 84.369 kg (186 lb) Former Smoker         Basic Information     Date Of Birth Sex Race Ethnicity Preferred Language    1995 Female White Non- English      Problem List              ICD-10-CM Priority Class Noted - Resolved    Fibromyalgia M79.7   9/3/2014 - Present    Arthritis M19.90   9/3/2014 - Present    Hx of migraine headaches Z86.69   9/3/2014 - Present    Previous  delivery, antepartum O34.219   10/17/2016 - Present    Supervision of other high risk pregnancy, antepartum O09.899   2017 - Present      Health Maintenance        Date Due Completion Dates    IMM HEP B VACCINE (1 of 3 - Primary Series) 1993 ---    IMM HEP A VACCINE (1 of 2 - Standard Series) 1994 ---    IMM HPV VACCINE (1 of 3 - Female 3 Dose Series) 2004 ---    IMM VARICELLA (CHICKENPOX) VACCINE (1 of 2 - 2 Dose Adolescent Series) 2006 ---    IMM INFLUENZA (1) 2016 ---    PAP SMEAR 3/25/2019 3/25/2016, 3/25/2016 (Done), 2014    Override on 3/25/2016: Done    IMM DTaP/Tdap/Td Vaccine (3 - Td) 2026, 2014            Current Immunizations     MMR/Varicella Combined Vaccine  Incomplete    Tdap Vaccine 2016,  Incomplete, 2014 11:30 PM      Below and/or attached are the medications your provider expects you to take. Review all of your home medications and newly ordered medications with your  provider and/or pharmacist. Follow medication instructions as directed by your provider and/or pharmacist. Please keep your medication list with you and share with your provider. Update the information when medications are discontinued, doses are changed, or new medications (including over-the-counter products) are added; and carry medication information at all times in the event of emergency situations     Allergies:  ORTHO TRI-CYCLEN - Anaphylaxis               Medications  Valid as of: March 08, 2017 - 11:40 AM    Generic Name Brand Name Tablet Size Instructions for use    Prenatal MV-Min-Fe Fum-FA-DHA   Take  by mouth.        .                 Medicines prescribed today were sent to:     [facility] [place], [place] - [address] [employer]    [address] [place] [address]    Phone: [phone] Fax: [phone]    Open 24 Hours?: No      Medication refill instructions:       If your prescription bottle indicates you have medication refills left, it is not necessary to call your provider’s office. Please contact your pharmacy and they will refill your medication.    If your prescription bottle indicates you do not have any refills left, you may request refills at any time through one of the following ways: The online [employer] system (except Urgent Care), by calling your provider’s office, or by asking your pharmacy to contact your provider’s office with a refill request. Medication refills are processed only during regular business hours and may not be available until the next business day. Your provider may request additional information or to have a follow-up visit with you prior to refilling your medication.   *Please Note: Medication refills are assigned a new Rx number when refilled electronically. Your pharmacy may indicate that no refills were authorized even though a new prescription for the same medication is available at the pharmacy. Please request the medicine by name with the pharmacy before  contacting your provider for a refill.        Other Notes About Your Plan     Sees Acupuncturist for Fibromyalgia   Review of Op note: 5cm , late decelerations , Low transverse , single layer only   Desires TOLAC           Skinkers Access Code: [email or web address]  Expires: 3/12/2017  2:39 PM    Skinkers  A secure, online tool to manage your health information     [employer]’s Skinkers® is a secure, online tool that connects you to your personalized health information from the privacy of your home -- day or night - making it very easy for you to manage your healthcare. Once the activation process is completed, you can even access your medical information using the Skinkers damian, which is available for free in the Apple Damian store or Google Play store.     Skinkers provides the following levels of access (as shown below):   My Chart Features   [facility] Doctor [facility]  Specialists [facility]  Urgent  Care Non-[facility]  Primary Care  Doctor   Email your healthcare team securely and privately 24/7 X X X    Manage appointments: schedule your next appointment; view details of past/upcoming appointments X      Request prescription refills. X      View recent personal medical records, including lab and immunizations X X X X   View health record, including health history, allergies, medications X X X X   Read reports about your outpatient visits, procedures, consult and ER notes X X X X   See your discharge summary, which is a recap of your hospital and/or ER visit that includes your diagnosis, lab results, and care plan. X X       How to register for Skinkers:  1. Go to  [email or web address].  2. Click on the Sign Up Now box, which takes you to the New Member Sign Up page. You will need to provide the following information:  a. Enter your Skinkers Access Code exactly as it appears at the top of this page. (You will not need to use this code after you’ve completed the sign-up process. If you do not sign up before the  expiration date, you must request a new code.)   b. Enter your date of birth.   c. Enter your home email address.   d. Click Submit, and follow the next screen’s instructions.  3. Create a Yellloh ID. This will be your Yellloh login ID and cannot be changed, so think of one that is secure and easy to remember.  4. Create a Yellloh password. You can change your password at any time.  5. Enter your Password Reset Question and Answer. This can be used at a later time if you forget your password.   6. Enter your e-mail address. This allows you to receive e-mail notifications when new information is available in Yellloh.  7. Click Sign Up. You can now view your health information.    For assistance activating your Yellloh account, call [phone]

## 2017-03-08 NOTE — IP AVS SNAPSHOT
3/12/2017          Rowan Leos  [address]  [place] [address]    Dear Rowan WILKES:    [facility] wants to ensure your discharge home is safe and you or your loved ones have had all your questions answered regarding your care after you leave the hospital.    You may receive a telephone call within two days of your discharge.  This call is to make certain you understand your discharge instructions as well as ensure we provided you with the best care possible during your stay with us.     The call will only last approximately 3-5 minutes and will be done by a nurse.    Once again, we want to ensure your discharge home is safe and that you have a clear understanding of any next steps in your care.  If you have any questions or concerns, please do not hesitate to contact us, we are here for you.  Thank you for choosing [facility] for your healthcare needs.    Sincerely,    Igor Amaya    [facility]

## 2017-03-08 NOTE — IP AVS SNAPSHOT
Home Care Instructions                                                                                                                Rowan Leos   MRN: [id number]    Department:  POST PARTUM 31   3/8/2017           Follow-up Information     1. Follow up with Chemo Ndiaye M.D. In 1 week.    Specialty:  OB/Gyn    Contact information    [address]  [place] [address]  [phone]         I assume responsibility for securing a follow-up Galva Screening blood test on my baby within the specified date range.    -                  Discharge Instructions       POSTPARTUM DISCHARGE INSTRUCTIONS FOR MOM    YOB: 1995   Age: 21 y.o.               Admit Date: 3/8/2017     Discharge Date: 3/12/2017  Attending Doctor:  Chemo Ndiaye M.D.                  Allergies:  Ortho tri-cyclen    Discharged to home by car. Discharged via wheelchair, hospital escort: Yes.  Special equipment needed: Not Applicable  Belongings with: Personal  Be sure to schedule a follow-up appointment with your primary care doctor or any specialists as instructed.     Discharge Plan:   Diet Plan: Discussed  Activity Level: Discussed  Confirmed Follow up Appointment: Patient to Call and Schedule Appointment  Confirmed Symptoms Management: Discussed  Medication Reconciliation Updated: Yes  Influenza Vaccine Indication: Not indicated: Previously immunized this influenza season and > 8 years of age    REASONS TO CALL YOUR OBSTETRICIAN:  1.   Persistent fever or shaking chills (Temperature higher than 100.4)  2.   Heavy bleeding (soaking more than 1 pad per hour); Passing clots  3.   Foul odor from vagina  4.   Mastitis (Breast infection; breast pain, chills, fever, redness)  5.   Urinary pain, burning or frequency  6.   Episiotomy infection  7.   Abdominal incision infection  8.   Severe depression longer than 24 hours    HAND WASHING  · Prior to handling the baby.  · Before breastfeeding or bottle feeding baby.  · After using  "the bathroom or changing the baby's diaper.    WOUND CARE  Ask your physician for additional care instructions.  In general:    ·  Incision:      · Keep clean and dry.    · Do NOT lift anything heavier than your baby for up to 6 weeks.    · There should not be any opening or pus.      VAGINAL CARE  · Nothing inside vagina for 6 weeks: no sexual intercourse, tampons or douching.  · Bleeding may continue for 2-4 weeks.  Amount may vary.    · Call your physician for heavy bleeding which means soaking more than 1 pad per hour    BIRTH CONTROL  · It is possible to become pregnant at any time after delivery and while breastfeeding.  · Plan to discuss a method of birth control with your physician at your follow up visit. visit.    DIET AND ELIMINATION  · Eating more fiber (bran cereal, fruits, and vegetables) and drinking plenty of fluids will help to avoid constipation.  · Urinary frequency after childbirth is normal.    POSTPARTUM BLUES  During the first few days after birth, you may experience a sense of the \"blues\" which may include impatience, irritability or even crying.  These feeling come and go quickly.  However, as many as 1 in 10 women experience emotional symptoms known as postpartum depression.    Postpartum depression:  May start as early as the second or third day after delivery or take several weeks or months to develop.  Symptoms of \"blues\" are present, but are more intense:  Crying spells; loss of appetite; feelings of hopelessness or loss of control; fear of touching the baby; over concern or no concern at all about the baby; little or no concern about your own appearance/caring for yourself; and/or inability to sleep or excessive sleeping.  Contact your physician if you are experiencing any of these symptoms.    Crisis Hotline:  · National Crisis Hotline:  6-079-QLTYOPK  Or 1-794.753.8254  · [place] Crisis Hotline:  1-445.567.3368  Or 503-158-4204    PREVENTING SHAKEN BABY:  If you are angry or " "stressed, PUT THE BABY IN THE CRIB, step away, take some deep breaths, and wait until you are calm to care for the baby.  DO NOT SHAKE THE BABY.  You are not alone, call a supporter for help.    · Crisis Call Center 24/7 crisis line 425-112-7333 or 1-933.581.6117  · You can also text them, text \"ANSWER\" to 280833    QUIT SMOKING/TOBACCO USE:  I understand the use of any tobacco products increases my chance of suffering from future heart disease and could cause other illnesses which may shorten my life. Quitting the use of tobacco products is the single most important thing I can do to improve my health. For further information on smoking / tobacco cessation call a Toll Free Quit Line at 1-659.150.2507 (*National Cancer [place]) or 1-194.845.1591 (American Lung Association) or you can access the web based program at www.lungusa.org.    · [place] Tobacco Users Help Line:  (137) 446-3439       Toll Free: 1-320.210.9984  · Quit Tobacco Program [facility] (064)139-3873    DEPRESSION / SUICIDE RISK:  As you are discharged from this [facility], it is important to learn how to keep safe from harming yourself.    Recognize the warning signs:  · Abrupt changes in personality, positive or negative- including increase in energy   · Giving away possessions  · Change in eating patterns- significant weight changes-  positive or negative  · Change in sleeping patterns- unable to sleep or sleeping all the time   · Unwillingness or inability to communicate  · Depression  · Unusual sadness, discouragement and loneliness  · Talk of wanting to die  · Neglect of personal appearance   · Rebelliousness- reckless behavior  · Withdrawal from people/activities they love  · Confusion- inability to concentrate     If you or a loved one observes any of these behaviors or has concerns about self-harm, here's what you can do:  · Talk about it- your feelings and reasons for harming yourself  · Remove any means " that you might use to hurt yourself (examples: pills, rope, extension cords, firearm)  · Get professional help from the community (Mental Health, Substance Abuse, psychological counseling)  · Do not be alone:Call your Safe Contact- someone whom you trust who will be there for you.  · Call your local CRISIS HOTLINE 994-5052 or 740-291-3825  · Call your local Children's Mobile Crisis Response Team [place] (052) 225-1461 or wwwSetPoint Medical  · Call the toll free National Suicide Prevention Hotlines   · National Suicide Prevention Lifeline 393-730-DUNO (6693)  · National Hope Line Network 800-SUICIDE (252-9729)    DISCHARGE SURVEY:  Thank you for choosing [facility].  We hope we provided you with very good care.  You may be receiving a survey in the mail.  Please fill it out.  Your opinion is valuable to us.    ADDITIONAL EDUCATIONAL MATERIALS GIVEN TO PATIENT:        My signature on this form indicates that:  1.  I have reviewed and understand the above information  2.  My questions regarding this information have been answered to my satisfaction.  3.  I have formulated a plan with my discharge nurse to obtain my prescribed medication for home.         Discharge Medication Instructions:    Below are the medications your physician expects you to take upon discharge:    Review all your home medications and newly ordered medications with your doctor and/or pharmacist. Follow medication instructions as directed by your doctor and/or pharmacist.    Please keep your medication list with you and share with your physician.               Medication List      START taking these medications        Instructions     MG Caps   Last time this was given:  100 mg on 3/12/2017  8:50 AM    Take 100 mg by mouth 2 times a day as needed for Constipation.   Dose:  100 mg       ferrous sulfate 325 (65 FE) MG tablet   Last time this was given:  325 mg on 3/12/2017  8:50 AM    Take 1 Tab by mouth 3 times a day, with meals.      Dose:  325 mg       ibuprofen 600 MG Tabs   Last time this was given:  600 mg on 3/12/2017  3:57 AM   Commonly known as:  MOTRIN    Take 1 Tab by mouth every 6 hours as needed (For cramping after delivery; do not give if patient is receiving ketorolac (Toradol)).   Dose:  600 mg       oxycodone-acetaminophen  MG Tabs   Last time this was given:  1 Tab on 3/11/2017  5:29 PM   Commonly known as:  PERCOCET-10    Take 1 Tab by mouth every four hours as needed for Severe Pain (Pain scale 7-10).   Dose:  1 Tab         CONTINUE taking these medications        Instructions    PRENATAL 1 PO    Take  by mouth.               Crisis Hotline:     [place] Crisis Hotline:  6-058-PWSHFCU or 1-256.523.2016    [place] Crisis Hotline:    1-704.974.2835 or 155-746-2248        Disclaimer           _____________________________________                     __________       ________       Patient/Mother Signature or Legal                          Date                   Time

## 2017-03-08 NOTE — PROGRESS NOTES
OB Follow Up--- High Risk  Prev C?s for TOLAc ,    Post Term , Prodromal labor : if no labor C/S 3/10      21 y.o. is a 40w6d presents in prenatal follow up.  Went to hospital last night : no cervical change   Subjective:no complaints  . Fetal Movement  positive    Problem List:has Fibromyalgia; Arthritis; Hx of migraine headaches; Previous  delivery, antepartum; and Supervision of other high risk pregnancy, antepartum on her problem list.     Objective:   /72 mmHg  Wt 84.369 kg (186 lb)    Abdomen:  S=D  Extremities:Normal        Lab:  Recent Results (from the past 336 hour(s))   FERN TEST    Collection Time: 17  6:00 AM   Result Value Ref Range    Fern Test On Amniotic Fluid see below Not present   POCT Fetal Nonstress Test    Collection Time: 17  9:46 AM   Result Value Ref Range    NST Indications post term     NST Baseline 125     NST Uterine Activity no     NST Acoustic Stimulation no     NST Assessment reactive     NST Action Necessary      NST Other Data      NST Return      NST Read By Senthil    POCT Fetal Nonstress Test    Collection Time: 17 10:51 AM   Result Value Ref Range    NST Indications      NST Baseline      NST Uterine Activity      NST Acoustic Stimulation      NST Assessment      NST Action Necessary      NST Other Data      NST Return      NST Read By       ARI Reactive   Assessment:    40w6d     Prev C/S for TOLAC   Prodromal Labor   Early pregnancy warning signs (bleeding,pain,discharge,leaking) discussed.    Plan:  Admit 3/10  Continue water intake  Ambulate nightly after 37 weeks  Continue Kick counts

## 2017-03-09 LAB
ERYTHROCYTE [DISTWIDTH] IN BLOOD BY AUTOMATED COUNT: 46.7 FL (ref 35.9–50)
HCT VFR BLD AUTO: 31.9 % (ref 37–47)
HGB BLD-MCNC: 10.1 G/DL (ref 12–16)
MCH RBC QN AUTO: 28.6 PG (ref 27–33)
MCHC RBC AUTO-ENTMCNC: 31.7 G/DL (ref 33.6–35)
MCV RBC AUTO: 90.4 FL (ref 81.4–97.8)
PLATELET # BLD AUTO: 167 K/UL (ref 164–446)
PMV BLD AUTO: 9.3 FL (ref 9–12.9)
RBC # BLD AUTO: 3.53 M/UL (ref 4.2–5.4)
WBC # BLD AUTO: 20.7 K/UL (ref 4.8–10.8)

## 2017-03-09 PROCEDURE — 85027 COMPLETE CBC AUTOMATED: CPT

## 2017-03-09 PROCEDURE — 700102 HCHG RX REV CODE 250 W/ 637 OVERRIDE(OP)

## 2017-03-09 PROCEDURE — A9270 NON-COVERED ITEM OR SERVICE: HCPCS | Performed by: OBSTETRICS & GYNECOLOGY

## 2017-03-09 PROCEDURE — 700101 HCHG RX REV CODE 250

## 2017-03-09 PROCEDURE — 36415 COLL VENOUS BLD VENIPUNCTURE: CPT

## 2017-03-09 PROCEDURE — 10907ZC DRAINAGE OF AMNIOTIC FLUID, THERAPEUTIC FROM PRODUCTS OF CONCEPTION, VIA NATURAL OR ARTIFICIAL OPENING: ICD-10-PCS | Performed by: OBSTETRICS & GYNECOLOGY

## 2017-03-09 PROCEDURE — A9270 NON-COVERED ITEM OR SERVICE: HCPCS

## 2017-03-09 PROCEDURE — 700111 HCHG RX REV CODE 636 W/ 250 OVERRIDE (IP)

## 2017-03-09 PROCEDURE — 304964 HCHG RECOVERY ROOM TIME 1HR

## 2017-03-09 PROCEDURE — 700112 HCHG RX REV CODE 229: Performed by: OBSTETRICS & GYNECOLOGY

## 2017-03-09 PROCEDURE — 59514 CESAREAN DELIVERY ONLY: CPT

## 2017-03-09 PROCEDURE — 10H07YZ INSERTION OF OTHER DEVICE INTO PRODUCTS OF CONCEPTION, VIA NATURAL OR ARTIFICIAL OPENING: ICD-10-PCS | Performed by: OBSTETRICS & GYNECOLOGY

## 2017-03-09 PROCEDURE — 302151 K-PAD 14X20: Performed by: OBSTETRICS & GYNECOLOGY

## 2017-03-09 PROCEDURE — 700102 HCHG RX REV CODE 250 W/ 637 OVERRIDE(OP): Performed by: OBSTETRICS & GYNECOLOGY

## 2017-03-09 PROCEDURE — 770002 HCHG ROOM/CARE - OB PRIVATE (112)

## 2017-03-09 PROCEDURE — 700102 HCHG RX REV CODE 250 W/ 637 OVERRIDE(OP): Performed by: ANESTHESIOLOGY

## 2017-03-09 PROCEDURE — 302131 K PAD MOTOR: Performed by: OBSTETRICS & GYNECOLOGY

## 2017-03-09 PROCEDURE — 700111 HCHG RX REV CODE 636 W/ 250 OVERRIDE (IP): Performed by: ANESTHESIOLOGY

## 2017-03-09 PROCEDURE — 306828 HCHG ANES-TIME GENERAL: Performed by: OBSTETRICS & GYNECOLOGY

## 2017-03-09 PROCEDURE — 303615 HCHG EPIDURAL/SPINAL ANESTHESIA FOR LABOR

## 2017-03-09 PROCEDURE — 304966 HCHG RECOVERY SVSC TIME ADDL 1/2 HR

## 2017-03-09 PROCEDURE — 305385 HCHG SURGICAL SERVICES 1/4 HOUR

## 2017-03-09 PROCEDURE — A9270 NON-COVERED ITEM OR SERVICE: HCPCS | Performed by: ANESTHESIOLOGY

## 2017-03-09 PROCEDURE — 700111 HCHG RX REV CODE 636 W/ 250 OVERRIDE (IP): Performed by: OBSTETRICS & GYNECOLOGY

## 2017-03-09 RX ORDER — ONDANSETRON 2 MG/ML
4 INJECTION INTRAMUSCULAR; INTRAVENOUS EVERY 6 HOURS PRN
Status: DISCONTINUED | OUTPATIENT
Start: 2017-03-09 | End: 2017-03-09

## 2017-03-09 RX ORDER — NALOXONE HYDROCHLORIDE 0.4 MG/ML
0.1 INJECTION, SOLUTION INTRAMUSCULAR; INTRAVENOUS; SUBCUTANEOUS PRN
Status: DISCONTINUED | OUTPATIENT
Start: 2017-03-09 | End: 2017-03-10

## 2017-03-09 RX ORDER — DEXTROSE, SODIUM CHLORIDE, SODIUM LACTATE, POTASSIUM CHLORIDE, AND CALCIUM CHLORIDE 5; .6; .31; .03; .02 G/100ML; G/100ML; G/100ML; G/100ML; G/100ML
INJECTION, SOLUTION INTRAVENOUS CONTINUOUS
Status: DISCONTINUED | OUTPATIENT
Start: 2017-03-09 | End: 2017-03-09

## 2017-03-09 RX ORDER — OXYCODONE AND ACETAMINOPHEN 10; 325 MG/1; MG/1
1 TABLET ORAL EVERY 4 HOURS PRN
Status: DISCONTINUED | OUTPATIENT
Start: 2017-03-09 | End: 2017-03-10

## 2017-03-09 RX ORDER — MISOPROSTOL 200 UG/1
TABLET ORAL
Status: COMPLETED
Start: 2017-03-09 | End: 2017-03-09

## 2017-03-09 RX ORDER — DIPHENHYDRAMINE HYDROCHLORIDE 50 MG/ML
25 INJECTION INTRAMUSCULAR; INTRAVENOUS EVERY 6 HOURS PRN
Status: DISCONTINUED | OUTPATIENT
Start: 2017-03-09 | End: 2017-03-10

## 2017-03-09 RX ORDER — KETOROLAC TROMETHAMINE 30 MG/ML
30 INJECTION, SOLUTION INTRAMUSCULAR; INTRAVENOUS EVERY 6 HOURS
Status: DISPENSED | OUTPATIENT
Start: 2017-03-09 | End: 2017-03-10

## 2017-03-09 RX ORDER — ONDANSETRON 4 MG/1
4 TABLET, ORALLY DISINTEGRATING ORAL EVERY 6 HOURS PRN
Status: DISCONTINUED | OUTPATIENT
Start: 2017-03-09 | End: 2017-03-09

## 2017-03-09 RX ORDER — METHYLERGONOVINE MALEATE 0.2 MG/ML
0.2 INJECTION INTRAVENOUS
Status: DISCONTINUED | OUTPATIENT
Start: 2017-03-09 | End: 2017-03-12 | Stop reason: HOSPADM

## 2017-03-09 RX ORDER — METOCLOPRAMIDE HYDROCHLORIDE 5 MG/ML
10 INJECTION INTRAMUSCULAR; INTRAVENOUS EVERY 6 HOURS PRN
Status: DISCONTINUED | OUTPATIENT
Start: 2017-03-09 | End: 2017-03-10

## 2017-03-09 RX ORDER — SODIUM CHLORIDE, SODIUM LACTATE, POTASSIUM CHLORIDE, CALCIUM CHLORIDE 600; 310; 30; 20 MG/100ML; MG/100ML; MG/100ML; MG/100ML
INJECTION, SOLUTION INTRAVENOUS PRN
Status: DISCONTINUED | OUTPATIENT
Start: 2017-03-09 | End: 2017-03-12 | Stop reason: HOSPADM

## 2017-03-09 RX ORDER — OXYCODONE HYDROCHLORIDE AND ACETAMINOPHEN 5; 325 MG/1; MG/1
1 TABLET ORAL EVERY 4 HOURS PRN
Status: DISCONTINUED | OUTPATIENT
Start: 2017-03-09 | End: 2017-03-10

## 2017-03-09 RX ORDER — METOCLOPRAMIDE HYDROCHLORIDE 5 MG/ML
10 INJECTION INTRAMUSCULAR; INTRAVENOUS ONCE
Status: DISCONTINUED | OUTPATIENT
Start: 2017-03-09 | End: 2017-03-09 | Stop reason: HOSPADM

## 2017-03-09 RX ORDER — DOCUSATE SODIUM 100 MG/1
100 CAPSULE, LIQUID FILLED ORAL 2 TIMES DAILY PRN
Status: DISCONTINUED | OUTPATIENT
Start: 2017-03-09 | End: 2017-03-12 | Stop reason: HOSPADM

## 2017-03-09 RX ORDER — MISOPROSTOL 200 UG/1
800 TABLET ORAL ONCE
Status: ACTIVE | OUTPATIENT
Start: 2017-03-09 | End: 2017-03-10

## 2017-03-09 RX ORDER — VITAMIN A ACETATE, BETA CAROTENE, ASCORBIC ACID, CHOLECALCIFEROL, .ALPHA.-TOCOPHEROL ACETATE, DL-, THIAMINE MONONITRATE, RIBOFLAVIN, NIACINAMIDE, PYRIDOXINE HYDROCHLORIDE, FOLIC ACID, CYANOCOBALAMIN, CALCIUM CARBONATE, FERROUS FUMARATE, ZINC OXIDE, CUPRIC OXIDE 3080; 12; 120; 400; 1; 1.84; 3; 20; 22; 920; 25; 200; 27; 10; 2 [IU]/1; UG/1; MG/1; [IU]/1; MG/1; MG/1; MG/1; MG/1; MG/1; [IU]/1; MG/1; MG/1; MG/1; MG/1; MG/1
1 TABLET, FILM COATED ORAL EVERY MORNING
Status: DISCONTINUED | OUTPATIENT
Start: 2017-03-09 | End: 2017-03-12 | Stop reason: HOSPADM

## 2017-03-09 RX ORDER — MISOPROSTOL 200 UG/1
600 TABLET ORAL
Status: DISCONTINUED | OUTPATIENT
Start: 2017-03-09 | End: 2017-03-12 | Stop reason: HOSPADM

## 2017-03-09 RX ORDER — SODIUM CHLORIDE, SODIUM LACTATE, POTASSIUM CHLORIDE, CALCIUM CHLORIDE 600; 310; 30; 20 MG/100ML; MG/100ML; MG/100ML; MG/100ML
1500 INJECTION, SOLUTION INTRAVENOUS ONCE
Status: COMPLETED | OUTPATIENT
Start: 2017-03-09 | End: 2017-03-10

## 2017-03-09 RX ORDER — SIMETHICONE 80 MG
80 TABLET,CHEWABLE ORAL 4 TIMES DAILY PRN
Status: DISCONTINUED | OUTPATIENT
Start: 2017-03-09 | End: 2017-03-12 | Stop reason: HOSPADM

## 2017-03-09 RX ORDER — ONDANSETRON 2 MG/ML
4 INJECTION INTRAMUSCULAR; INTRAVENOUS EVERY 6 HOURS PRN
Status: DISCONTINUED | OUTPATIENT
Start: 2017-03-09 | End: 2017-03-10

## 2017-03-09 RX ORDER — ONDANSETRON 4 MG/1
4 TABLET, ORALLY DISINTEGRATING ORAL EVERY 6 HOURS PRN
Status: DISCONTINUED | OUTPATIENT
Start: 2017-03-10 | End: 2017-03-10

## 2017-03-09 RX ORDER — DIPHENHYDRAMINE HYDROCHLORIDE 50 MG/ML
12.5 INJECTION INTRAMUSCULAR; INTRAVENOUS EVERY 6 HOURS PRN
Status: DISCONTINUED | OUTPATIENT
Start: 2017-03-09 | End: 2017-03-10

## 2017-03-09 RX ORDER — DOCUSATE SODIUM 100 MG/1
100 CAPSULE, LIQUID FILLED ORAL 2 TIMES DAILY
Status: DISCONTINUED | OUTPATIENT
Start: 2017-03-09 | End: 2017-03-09

## 2017-03-09 RX ORDER — ONDANSETRON 2 MG/ML
4 INJECTION INTRAMUSCULAR; INTRAVENOUS EVERY 6 HOURS PRN
Status: DISCONTINUED | OUTPATIENT
Start: 2017-03-10 | End: 2017-03-10

## 2017-03-09 RX ADMIN — SODIUM CHLORIDE, POTASSIUM CHLORIDE, SODIUM LACTATE AND CALCIUM CHLORIDE 1000 ML: 600; 310; 30; 20 INJECTION, SOLUTION INTRAVENOUS at 03:10

## 2017-03-09 RX ADMIN — SODIUM CHLORIDE, POTASSIUM CHLORIDE, SODIUM LACTATE AND CALCIUM CHLORIDE: 600; 310; 30; 20 INJECTION, SOLUTION INTRAVENOUS at 01:00

## 2017-03-09 RX ADMIN — SIMETHICONE CHEW TAB 80 MG 80 MG: 80 TABLET ORAL at 08:52

## 2017-03-09 RX ADMIN — OXYCODONE HYDROCHLORIDE AND ACETAMINOPHEN 1 TABLET: 10; 325 TABLET ORAL at 17:32

## 2017-03-09 RX ADMIN — DOCUSATE SODIUM 100 MG: 100 CAPSULE ORAL at 17:33

## 2017-03-09 RX ADMIN — SODIUM CHLORIDE, SODIUM LACTATE, POTASSIUM CHLORIDE, CALCIUM CHLORIDE AND DEXTROSE MONOHYDRATE: 5; 600; 310; 30; 20 INJECTION, SOLUTION INTRAVENOUS at 02:08

## 2017-03-09 RX ADMIN — ONDANSETRON 4 MG: 2 INJECTION, SOLUTION INTRAMUSCULAR; INTRAVENOUS at 17:35

## 2017-03-09 RX ADMIN — Medication 20 UNITS: at 07:00

## 2017-03-09 RX ADMIN — OXYCODONE HYDROCHLORIDE AND ACETAMINOPHEN 1 TABLET: 5; 325 TABLET ORAL at 08:51

## 2017-03-09 RX ADMIN — KETOROLAC TROMETHAMINE 30 MG: 30 INJECTION, SOLUTION INTRAMUSCULAR; INTRAVENOUS at 10:52

## 2017-03-09 RX ADMIN — SIMETHICONE CHEW TAB 80 MG 80 MG: 80 TABLET ORAL at 17:34

## 2017-03-09 RX ADMIN — OXYTOCIN 20 UNITS: 10 INJECTION, SOLUTION INTRAMUSCULAR; INTRAVENOUS at 03:57

## 2017-03-09 RX ADMIN — MISOPROSTOL 800 MCG: 200 TABLET ORAL at 04:35

## 2017-03-09 RX ADMIN — ONDANSETRON 4 MG: 2 INJECTION, SOLUTION INTRAMUSCULAR; INTRAVENOUS at 10:51

## 2017-03-09 RX ADMIN — KETOROLAC TROMETHAMINE 30 MG: 30 INJECTION, SOLUTION INTRAMUSCULAR; INTRAVENOUS at 23:39

## 2017-03-09 RX ADMIN — KETOROLAC TROMETHAMINE 30 MG: 30 INJECTION, SOLUTION INTRAMUSCULAR; INTRAVENOUS at 17:31

## 2017-03-09 RX ADMIN — OXYCODONE HYDROCHLORIDE AND ACETAMINOPHEN 1 TABLET: 10; 325 TABLET ORAL at 21:34

## 2017-03-09 RX ADMIN — OXYCODONE HYDROCHLORIDE AND ACETAMINOPHEN 1 TABLET: 5; 325 TABLET ORAL at 13:00

## 2017-03-09 ASSESSMENT — PAIN SCALES - GENERAL
PAINLEVEL_OUTOF10: 0
PAINLEVEL_OUTOF10: 0
PAINLEVEL_OUTOF10: 7
PAINLEVEL_OUTOF10: 0
PAINLEVEL_OUTOF10: 5
PAINLEVEL_OUTOF10: 3
PAINLEVEL_OUTOF10: 4
PAINLEVEL_OUTOF10: 0
PAINLEVEL_OUTOF10: 7
PAINLEVEL_OUTOF10: 0
PAINLEVEL_OUTOF10: 3
PAINLEVEL_OUTOF10: 0
PAINLEVEL_OUTOF10: 5
PAINLEVEL_OUTOF10: 3
PAINLEVEL_OUTOF10: 4
PAINLEVEL_OUTOF10: 0

## 2017-03-09 ASSESSMENT — LIFESTYLE VARIABLES: DO YOU DRINK ALCOHOL: NO

## 2017-03-09 NOTE — PROGRESS NOTES
21 y.o. year old female  at 40w6d  With an EDC of 3/2/2017 presents here today for UC's that she says started to become progressively worse at 1700 pm. Report positive FM. Denies VB or LOF. Denies problems with her pregnancy.    : SVE, /- done per YUKO Molina, RN.  : Spoke with Dr. Cisneros. Admission orders received.  : Dr. Cisneros at bedside to evaluate pt in person. POC discussed. Pt verbalizes understanding.  : Dr. Cheung at bedside to place epidural.  : Epidural in place. Tolerated well.  : Sheets placed. SVE,  done per YUKO Molina, RN.  : Dr. Cisneros at bedside to evaluate pt.  : AROM, clear. SVE 5-6cm done per Dr. Cisneros. IUPC placed. FSE attempted to be placed x2 attempts, not reading.  : LD's, SVE,  done per YUKO Molina, RN. Pt repositioned left to right side. Oxygen mask placed at 10L. IV fluids increased.   : Spoke with Dr. Cisneros and reviewed FHT tracing together. Pt have LD/VD. Discussed interventions performed.  : Dr. Cisneros at bedside to evaluate pt in person.  0100: SVE 7-8/90/0 done per YUKO Molina RN.  0150: Spoke with Dr. Cisneros, updated of FHT tracing and recent SVE. Orders received for D5LR.  0210: LD's: peanut ball removed. Pt placed in thrown position wedged to left side. Oxygen mask placed at 10L. IV fluids changed to D5LR at 0208, See MAR.  0230: SVE Lip/0. Pt placed in thrown position. Oxygen remains at 10L.   0255: Spoke with Dr. Cisneros concerning SVE anf FHT LD's. Per MD reviewed tracing. Will come evaluate pt in person.  030: Dr. Cisneros at bedside to evaluate pt. SVE, per MD cervix unchanged.  0310: Dr. Cisneros discussed  section with pt due to FHT tracing, LD's. Pt agreed to  section.

## 2017-03-09 NOTE — CARE PLAN
Problem: Potential for postpartum infection related to surgical incision, compromised uterine condition, urinary tract or respiratory compromise  Goal: Patient will be afebrile and free from signs and symptoms of infection  Outcome: PROGRESSING AS EXPECTED  Pt shows no current s/s of infection at this time. VS WNL. Will continue to monitor     Problem: Potential knowledge deficit related to lack of understanding of self and  care  Goal: Patient will verbalize understanding of self and infant care  Outcome: PROGRESSING AS EXPECTED  Pt verbalizes and demonstrates ability to care for self and infant appropriately.

## 2017-03-09 NOTE — PROGRESS NOTES
Cervix-5-6 cm/100%effac/0 station    AROM-clear fluid    IUPC,FSE    Category 1 fhr tracing    Epidural placed

## 2017-03-09 NOTE — PROGRESS NOTES
Pt admitted to room S329 via [facility] with, infant, and L&D RN. Report received from JOSE Butt. VSS.  in use. SCDs in use. IS at bedside, pt demonstrates proper use. Denies pain at this time. Oriented to unit, room, infant sleeping policy, infant feeding policy, security policy, call light, skylight, emergency call light, and POC.  Call light within reach, encouraged to call for assistance.

## 2017-03-09 NOTE — RESPIRATORY CARE
Attendance at Delivery    Reason for attendance :  Oxygen Needed : no  Positive Pressure Needed :no  Baby Vigorous yes  Evidence of Meconium : no

## 2017-03-09 NOTE — H&P
History and Physical;      Rowan Leos is a 21 y.o. year old female  at 40w6d who presents for ctx's    Subjective:   positive  For CTXS.   positive Feels pain   negative for LOF  negative for vaginal bleeding.   positive for fetal movement    ROS: A comprehensive review of systems was negative.    Past Medical History   Diagnosis Date   • Fibromyalgia    • Arthritis    • Headache, classical migraine    • Migraines    • Supervision of other high risk pregnancy, antepartum 2017     Past Surgical History   Procedure Laterality Date   • Other orthopedic surgery     • Primary c section  2014     Performed by Rebekah Rodriguez M.D. at [facility]     OB History    Para Term  AB SAB TAB Ectopic Multiple Living   3 1 1  1 1    1      # Outcome Date GA Lbr Last/2nd Weight Sex Delivery Anes PTL Lv   3 Current            2 Term 14    F CS-Unspec   Y   1 SAB  8w0d               Social History     Social History   • Marital Status:      Spouse Name: N/A   • Number of Children: N/A   • Years of Education: N/A     Occupational History   • Not on file.     Social History Main Topics   • Smoking status: Former Smoker     Start date: 2011   • Smokeless tobacco: Never Used   • Alcohol Use: No   • Drug Use: Yes      Comment: [email or web address]    • Sexual Activity: Not on file     Other Topics Concern   • Not on file     Social History Narrative     Allergies: Ortho tri-cyclen    Current facility-administered medications:   •  LACTATED RINGERS IV SOLN, , , ,   •  oxytocin (PITOCIN) 20 UNITS/1000ML LR, , , ,   •  LR infusion, , Intravenous, Continuous, Hemanth Cisneros M.D.  •  fentaNYL (SUBLIMAZE) injection 50 mcg, 50 mcg, Intravenous, Q HOUR PRN, Hemanth Cisneros M.D.  •  fentaNYL (SUBLIMAZE) injection 100 mcg, 100 mcg, Intravenous, Q HOUR PRN, Hemanth Cisneros M.D.  •  mag hydrox-al hydrox-simeth (MAALOX PLUS ES or MYLANTA DS) suspension 30 mL, 30 mL, Oral, Q6HRS PRN, Hemanth Cisneros  "M.D.  •  misoprostol (CYTOTEC) tablet 800 mcg, 800 mcg, Rectal, Once PRN, Hemanth Cisneros M.D.  •  methylergonovine (METHERGINE) injection 0.2 mg, 0.2 mg, Intramuscular, Once PRN, Hemanth Cisneros M.D.    Prenatal care with [facility] with the following problems:  Patient Active Problem List    Diagnosis Date Noted   • Labor and delivery, indication for care 2017   • Supervision of other high risk pregnancy, antepartum 2017   • Previous  delivery, antepartum 10/17/2016   • Fibromyalgia 2014   • Arthritis 2014   • Hx of migraine headaches 2014         Objective:      Blood pressure 138/80, pulse 91, temperature 36.7 °C (98 °F), height 1.6 m (5' 3\"), weight 83.462 kg (184 lb).    General:   no acute distress   Skin:   normal   HEENT:  PERRLA   Lungs:   CTA bilateral   Heart:   brisk carotid upstroke without bruits, peripheral pulses very brisk, chest is clear without rales or wheezing, no pedal edema, no JVD, no hepatosplenomegaly   Abdomen:   gravid, NT   EFW:  3300 g   Pelvis:  Vulva and vagina appear normal. Bimanual exam reveals normal uterus and adnexa., proven to 0 g   FHTs: 140 BPM good accels no decels good variability   Contractions: 3 contractions in 10 min moderate to palpation   Uterine Size: S=D   Presentations: Cephalic per Kristen RN   Cervix: Per Kristen RN    Dilation: 4 cm    Effacement: 75%    Station:  -1    Consistency: Soft    Position: Middle     Complete OB US  See labs    Lab Review  Lab:   Blood type: O     Recent Results (from the past 5880 hour(s))   POCT US - In Clinic OB Scan    Collection Time: 16  3:03 PM   Result Value Ref Range    In Clinic OB Scan     Chlamydia/GC PCR Urine or Swab    Collection Time: 16  9:53 AM   Result Value Ref Range    Source Urine     C. trachomatis by PCR Negative Negative    N. gonorrhoeae by PCR Negative Negative   PRENATAL PANEL 3+HIV+UACXI    Collection Time: 16  1:17 PM   Result Value Ref Range    Color " Colorless     Character Clear     Specific Gravity 1.006 <1.035    Ph 6.0 5.0-8.0    Glucose Negative Negative mg/dL    Ketones Negative Negative mg/dL    Protein Negative Negative mg/dL    Bilirubin Negative Negative    Nitrite Negative Negative    Leukocyte Esterase Negative Negative    Occult Blood Negative Negative    Micro Urine Req see below     Culture Indicated No UA Culture    WBC 9.0 4.8 - 10.8 K/uL    RBC 4.25 4.20 - 5.40 M/uL    Hemoglobin 12.7 12.0 - 16.0 g/dL    Hematocrit 38.7 37.0 - 47.0 %    MCV 91.1 81.4 - 97.8 fL    MCH 29.9 27.0 - 33.0 pg    MCHC 32.8 (L) 33.6 - 35.0 g/dL    RDW 40.9 35.9 - 50.0 fL    Platelet Count 224 164 - 446 K/uL    MPV 9.4 9.0 - 12.9 fL    Neutrophils-Polys 74.50 (H) 44.00 - 72.00 %    Lymphocytes 20.70 (L) 22.00 - 41.00 %    Monocytes 4.20 0.00 - 13.40 %    Eosinophils 0.10 0.00 - 6.90 %    Basophils 0.20 0.00 - 1.80 %    Immature Granulocytes 0.30 0.00 - 0.90 %    Nucleated RBC 0.00 /100 WBC    Neutrophils (Absolute) 6.70 2.00 - 7.15 K/uL    Lymphs (Absolute) 1.86 1.00 - 4.80 K/uL    Monos (Absolute) 0.38 0.00 - 0.85 K/uL    Eos (Absolute) 0.01 0.00 - 0.51 K/uL    Baso (Absolute) 0.02 0.00 - 0.12 K/uL    Immature Granulocytes (abs) 0.03 0.00 - 0.11 K/uL    NRBC (Absolute) 0.00 K/uL    Rubella IgG Antibody 40.40 IU/mL    Syphilis, Treponemal Qual Non Reactive Non Reactive    Hepatitis B Surface Antigen Negative Negative   HIV ANTIBODIES    Collection Time: 09/21/16  1:17 PM   Result Value Ref Range    HIV Ag/Ab Combo Assay Non Reactive Non Reactive   OP PRENATAL PANEL-BLOOD BANK    Collection Time: 09/21/16  1:17 PM   Result Value Ref Range    ABO Grouping Only O     Rh Grouping Only POS     Antibody Screen Scrn NEG    AFP TETRA    Collection Time: 12/13/16 12:00 PM   Result Value Ref Range    AFP Value -Eia 207 ng/mL    AFP MOM Value Not Done     Hcg Value 4799 IU/L    Hcg Mom Not Done     Ue3 Value 3.30 ng/mL    Ue3 Mom Not Done     Interpretation See Note     Maternal  [age] at NICK 23[age]5 yr    Gestational Age Based On US     Gestational Age 28.71 weeks    Insulin Dependent Diabetes No     Race White     Multiple Pregnancy One     Zainab Value -Eia 290 pg/mL    Zainab Mom Value Not Done     Maternal Weight 153 lbs    Err Maternal Scrn AFP See Note    CBC WITHOUT DIFFERENTIAL    Collection Time: 12/13/16 12:00 PM   Result Value Ref Range    WBC 11.3 (H) 4.8 - 10.8 K/uL    RBC 4.13 (L) 4.20 - 5.40 M/uL    Hemoglobin 12.2 12.0 - 16.0 g/dL    Hematocrit 38.5 37.0 - 47.0 %    MCV 93.2 81.4 - 97.8 fL    MCH 29.5 27.0 - 33.0 pg    MCHC 31.7 (L) 33.6 - 35.0 g/dL    RDW 43.8 35.9 - 50.0 fL    Platelet Count 228 164 - 446 K/uL    MPV 9.5 9.0 - 12.9 fL   T.PALLIDUM AB EIA    Collection Time: 12/13/16 12:00 PM   Result Value Ref Range    Syphilis, Treponemal Qual Non Reactive Non Reactive   CYSTIC FIBROSIS(CFTR)165 PATHOGENIC VARIANTS    Collection Time: 12/13/16 12:00 PM   Result Value Ref Range    Cystic Fibrosis, Allele 1 Negative     Cystic Fibrosis, Allele 2 Negative     Cystic Fibrosis, 5T Variant Not Applicable     Cystic Fibrosis, 165 Variants, Interp 0 variants    GLUCOSE 1HR GESTATIONAL    Collection Time: 01/06/17  9:29 AM   Result Value Ref Range    Glucose, Post Dose 129 70 - 139 mg/dL   GRP B STREP, BY PCR (MIXON BROTH)    Collection Time: 02/01/17  2:43 PM   Result Value Ref Range    Strep Gp B DNA PCR Negative Negative   FERN TEST    Collection Time: 03/03/17  6:00 AM   Result Value Ref Range    Fern Test On Amniotic Fluid see below Not present   POCT Fetal Nonstress Test    Collection Time: 03/06/17  9:46 AM   Result Value Ref Range    NST Indications post term     NST Baseline 125     NST Uterine Activity no     NST Acoustic Stimulation no     NST Assessment reactive     NST Action Necessary      NST Other Data      NST Return      NST Read By Senthil    POCT Fetal Nonstress Test    Collection Time: 03/08/17 10:51 AM   Result Value Ref Range    NST Indications Post Term     NST Baseline  130     NST Uterine Activity no     NST Acoustic Stimulation no     NST Assessment reactive     NST Action Necessary      NST Other Data      NST Return      NST Read By Senthil         Assessment:   1.  IUP at 40w6d  2.  Labor status: Active phase labor.  3.  Cat 1 FHTs  4.  Obstetrical history significant for   Patient Active Problem List    Diagnosis Date Noted   • Labor and delivery, indication for care 2017   • Supervision of other high risk pregnancy, antepartum 2017   • Previous  delivery, antepartum 10/17/2016   • Fibromyalgia 2014   • Arthritis 2014   • Hx of migraine headaches 2014   .      Plan:     Admit to L&D  GBS negative  Routine labs  Pain management prn   form signed-Reviewed risk of

## 2017-03-09 NOTE — PROGRESS NOTES
Patient moved from PACU to room s-329 with assistance of nursing student. Patient moved from [place] to postpartum bed with assistance of CONNIE Culp RN. Pulse oximeter connected and IV fluids infusing. Sequentials in place. Patient will be cared for by DADA Vivar RN who entered the room as well. Patient was without signs or symptoms of distress. Labor RN present as well to give report to primary RN.

## 2017-03-09 NOTE — CARE PLAN
Problem: Safety  Goal: Free from accidental injury  Intervention: Initiate Safety Measures  Dicussed how to get in contact with RN in the event of an emergency, verbalizes understanding.      Problem: Pain  Goal: Alleviation of Pain or a reduction in pain to the patient’s comfort goal  Intervention: Initial pain assessment  Dicussed different methods of pain management, verbalizes understanding. Pt would like an epidural.

## 2017-03-09 NOTE — OP REPORT
DATE OF SERVICE:  2017    PREOPERATIVE DIAGNOSES:  Intrauterine pregnancy at 41 weeks with arrest of   descent and nonreassuring fetal heart rate tracing.    POSTOPERATIVE  DIAGNOSES:  Intrauterine pregnancy at 41 weeks with arrest of   descent and nonreassuring fetal heart rate tracing.    SURGEON:  Hemanth Cisneros MD    ASSISTANT:  Joi Olivera CNM    ANESTHESIA:  Epidural.    ANESTHESIOLOGIST:  John Beckett MD    PROCEDURE:  Repeat low transverse uterine  section.    COMPLICATIONS:  None.    ESTIMATED BLOOD LOSS:  600 mL    DRAINS:  Sheets catheter.    SPECIMENS:  Cord gases sent to pathology department.    INDICATIONS:  This patient is a 21-year-old white female  3, para   1-0-0-1 at 41 weeks, admitted in active labor, dilated to 9 cm with no descent   of the fetal head, passed 0 to -1 station as well as having repetitive late   decelerations.    FINDINGS:  Apgar scores 8 and 9.  Cord gases, arterial pH 7.12 with a base   excess of -9, venous pH 7.17 with a base excess of -8, cephalic presentation,   significant molding in the fetal vertex and swelling in the fetal scalp,   normal pelvis, clear amniotic fluid.    DESCRIPTION OF OPERATION:  After adequately being counseled, the patient was   taken to the operating room and epidural anesthesia was dosed.  She was   prepped and draped and Pfannenstiel skin incision made over the previous one,   taken down to the fascia.  The fascia was incised with scalpel and the fascial   incision taken laterally on both sides with Toth scissors.  Rectus fascia   dissected off the underlying rectus muscles both superiorly and inferiorly and   the rectus muscles were split in the midline.  The peritoneal cavity was   entered sharply with Metzenbaum scissors as high as possible, taking care to   avoid any internal viscera.  The peritoneal incision was taken superiorly and   inferiorly and a bladder blade placed over the bladder.  Next, a bladder flap   was  developed both sharply and bluntly and a bladder blade replaced over the   bladder.  Next, a low transverse uterine incision was made with a scalpel, the   infant was delivered, bulb suctioned, umbilical cord clamped and cut, and the   infant handed off to pediatrics.  Segment of umbilical cord was given to   nurses to draw cord gases.  Next, the uterus was exteriorized, the placenta   was removed and the uterine cavity was cleansed with a moist laparotomy   sponge.  Uterine incision was closed using a running locking stitch of 0   Vicryl.  Good hemostasis noted.  The uterus returned to the abdominal pelvic   cavity, the pelvis was irrigated and suctioned, hemostasis again confirmed.    Next, peritoneal lining was closed using running nonlocked stitch of 0 Vicryl.    Rectus muscles were reapproximated using interrupted stitch of 0 chromic.    Rectus fascia closed using running nonlocked stitch of 0 Vicryl, subcutaneous   tissues were irrigated and suctioned, electrocautery used for hemostasis,   several subcuticular stitches of 0 Vicryl used to reapproximate subcutaneous   tissues, skin was closed using surgical staples and a pressure dressing was   applied.  The patient was taken to recovery room in good condition.  No   complication noted.       ____________________________________     MD COLUMBA WYNNE / IONA    DD:  03/09/2017 04:47:36  DT:  03/09/2017 04:59:10    D#:  [id number]  Job#:  [id number]

## 2017-03-10 PROCEDURE — 700112 HCHG RX REV CODE 229: Performed by: OBSTETRICS & GYNECOLOGY

## 2017-03-10 PROCEDURE — A9270 NON-COVERED ITEM OR SERVICE: HCPCS | Performed by: OBSTETRICS & GYNECOLOGY

## 2017-03-10 PROCEDURE — 770002 HCHG ROOM/CARE - OB PRIVATE (112)

## 2017-03-10 PROCEDURE — 700102 HCHG RX REV CODE 250 W/ 637 OVERRIDE(OP): Performed by: ANESTHESIOLOGY

## 2017-03-10 PROCEDURE — A9270 NON-COVERED ITEM OR SERVICE: HCPCS | Performed by: ANESTHESIOLOGY

## 2017-03-10 PROCEDURE — A9270 NON-COVERED ITEM OR SERVICE: HCPCS | Performed by: PHYSICIAN ASSISTANT

## 2017-03-10 PROCEDURE — 700102 HCHG RX REV CODE 250 W/ 637 OVERRIDE(OP): Performed by: OBSTETRICS & GYNECOLOGY

## 2017-03-10 PROCEDURE — 700102 HCHG RX REV CODE 250 W/ 637 OVERRIDE(OP): Performed by: PHYSICIAN ASSISTANT

## 2017-03-10 RX ORDER — ONDANSETRON 2 MG/ML
4 INJECTION INTRAMUSCULAR; INTRAVENOUS EVERY 6 HOURS PRN
Status: DISCONTINUED | OUTPATIENT
Start: 2017-03-10 | End: 2017-03-12 | Stop reason: HOSPADM

## 2017-03-10 RX ORDER — ACETAMINOPHEN 325 MG/1
325 TABLET ORAL EVERY 4 HOURS PRN
Status: DISCONTINUED | OUTPATIENT
Start: 2017-03-10 | End: 2017-03-12 | Stop reason: HOSPADM

## 2017-03-10 RX ORDER — MORPHINE SULFATE 4 MG/ML
4 INJECTION, SOLUTION INTRAMUSCULAR; INTRAVENOUS
Status: DISCONTINUED | OUTPATIENT
Start: 2017-03-10 | End: 2017-03-12 | Stop reason: HOSPADM

## 2017-03-10 RX ORDER — OXYCODONE AND ACETAMINOPHEN 10; 325 MG/1; MG/1
1 TABLET ORAL EVERY 4 HOURS PRN
Status: DISCONTINUED | OUTPATIENT
Start: 2017-03-10 | End: 2017-03-12 | Stop reason: HOSPADM

## 2017-03-10 RX ORDER — DIPHENHYDRAMINE HCL 25 MG
25 TABLET ORAL EVERY 6 HOURS PRN
Status: DISCONTINUED | OUTPATIENT
Start: 2017-03-10 | End: 2017-03-12 | Stop reason: HOSPADM

## 2017-03-10 RX ORDER — OXYCODONE HYDROCHLORIDE AND ACETAMINOPHEN 5; 325 MG/1; MG/1
1 TABLET ORAL EVERY 4 HOURS PRN
Status: DISCONTINUED | OUTPATIENT
Start: 2017-03-10 | End: 2017-03-12 | Stop reason: HOSPADM

## 2017-03-10 RX ORDER — FERROUS SULFATE 325(65) MG
325 TABLET ORAL
Status: DISCONTINUED | OUTPATIENT
Start: 2017-03-10 | End: 2017-03-11

## 2017-03-10 RX ORDER — DIPHENHYDRAMINE HYDROCHLORIDE 50 MG/ML
25 INJECTION INTRAMUSCULAR; INTRAVENOUS EVERY 6 HOURS PRN
Status: DISCONTINUED | OUTPATIENT
Start: 2017-03-10 | End: 2017-03-12 | Stop reason: HOSPADM

## 2017-03-10 RX ORDER — IBUPROFEN 600 MG/1
600 TABLET ORAL EVERY 6 HOURS PRN
Status: DISCONTINUED | OUTPATIENT
Start: 2017-03-10 | End: 2017-03-12 | Stop reason: HOSPADM

## 2017-03-10 RX ORDER — ONDANSETRON 4 MG/1
4 TABLET, ORALLY DISINTEGRATING ORAL EVERY 6 HOURS PRN
Status: DISCONTINUED | OUTPATIENT
Start: 2017-03-10 | End: 2017-03-12 | Stop reason: HOSPADM

## 2017-03-10 RX ORDER — OXYCODONE HYDROCHLORIDE 10 MG/1
10 TABLET ORAL EVERY 4 HOURS PRN
Status: DISCONTINUED | OUTPATIENT
Start: 2017-03-10 | End: 2017-03-12 | Stop reason: HOSPADM

## 2017-03-10 RX ADMIN — OXYCODONE HYDROCHLORIDE AND ACETAMINOPHEN 1 TABLET: 10; 325 TABLET ORAL at 20:20

## 2017-03-10 RX ADMIN — IBUPROFEN 600 MG: 600 TABLET, FILM COATED ORAL at 06:20

## 2017-03-10 RX ADMIN — DOCUSATE SODIUM 100 MG: 100 CAPSULE ORAL at 10:26

## 2017-03-10 RX ADMIN — OXYCODONE HYDROCHLORIDE AND ACETAMINOPHEN 1 TABLET: 5; 325 TABLET ORAL at 06:20

## 2017-03-10 RX ADMIN — OXYCODONE HYDROCHLORIDE AND ACETAMINOPHEN 1 TABLET: 5; 325 TABLET ORAL at 02:38

## 2017-03-10 RX ADMIN — Medication 325 MG: at 10:26

## 2017-03-10 RX ADMIN — OXYCODONE HYDROCHLORIDE AND ACETAMINOPHEN 1 TABLET: 10; 325 TABLET ORAL at 15:28

## 2017-03-10 RX ADMIN — OXYCODONE HYDROCHLORIDE AND ACETAMINOPHEN 1 TABLET: 5; 325 TABLET ORAL at 10:26

## 2017-03-10 RX ADMIN — IBUPROFEN 600 MG: 600 TABLET, FILM COATED ORAL at 20:20

## 2017-03-10 RX ADMIN — IBUPROFEN 600 MG: 600 TABLET, FILM COATED ORAL at 12:39

## 2017-03-10 RX ADMIN — Medication 1 TABLET: at 10:26

## 2017-03-10 ASSESSMENT — PAIN SCALES - GENERAL
PAINLEVEL_OUTOF10: 4
PAINLEVEL_OUTOF10: 4
PAINLEVEL_OUTOF10: 2
PAINLEVEL_OUTOF10: 2
PAINLEVEL_OUTOF10: 4
PAINLEVEL_OUTOF10: 4
PAINLEVEL_OUTOF10: 7
PAINLEVEL_OUTOF10: 4
PAINLEVEL_OUTOF10: 8
PAINLEVEL_OUTOF10: 4
PAINLEVEL_OUTOF10: 2
PAINLEVEL_OUTOF10: 4

## 2017-03-10 NOTE — CARE PLAN
Problem: Communication  Goal: The ability to communicate needs accurately and effectively will improve  Outcome: PROGRESSING AS EXPECTED  Pt. Ambulating halls, voiding in toilet, taking adequate po fluids. Vitals stable     Problem: Pain Management  Goal: Pain level will decrease to patient’s comfort goal  Outcome: PROGRESSING AS EXPECTED  Patient managing pain with po PRN pain meds

## 2017-03-10 NOTE — CARE PLAN
Problem: Altered physiologic condition related to postoperative  delivery  Goal: Patient physiologically stable as evidenced by normal lochia, palpable uterine involution and vital signs within normal limits  Outcome: PROGRESSING AS EXPECTED  Patient's fundus is firm, lochia light and vital signs within defined limits. Patient educated on appropriate amounts of bleeding and when to notify RN/MD, patient verbalized understanding.     Problem: Potential for postpartum infection related to surgical incision, compromised uterine condition, urinary tract or respiratory compromise  Goal: Patient will be afebrile and free from signs and symptoms of infection  Outcome: PROGRESSING AS EXPECTED  Patient has no signs/symptoms of infection, afebrile. Patient educated about signs/symptoms of infection and when to notify MD, patient verbalized understanding.

## 2017-03-10 NOTE — PROGRESS NOTES
Patient assessed, Vitals stable, fundus firm, bleeding within normal limits, all questions & concerns answered, Pt. Has call light within reach & working.

## 2017-03-10 NOTE — DISCHARGE PLANNING
Medical Social Work    Referral: Post-Partum/NBN  Rozel with orthopaedic deformity. FOB is in [facility] [place], operated by [facility] on active duty.    Intervention: Met with MOB at bedside, a friend was there for support. Baby is a boy named German. MOB appeared appropriate. She said she had an emergency  at which point the [place] sent out a message to her  in [facility] [place], operated by [facility]. She expects to hear back tonight or tomorrow on whether or not he can come home from deployment for two weeks.     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 He is doing well otherwise and follow-up regarding his foot/leg will be on an outpatient basis. SW provided MOB with travel resources to assist with medical travel. The baby's pediatrician is Dr. Escamilla.    MOB appeared in good spirits despite emergency  and being surprised by baby's defect (it was not caught pre-natally) She stated she had support, including her mother who is available to attend baby's appointments with her.    Plan: No other needs at this time. MOB and baby OK to discharge.

## 2017-03-10 NOTE — PROGRESS NOTES
Post Partum Progress Note    Name:   Rowan Leos   Date/Time:  3/10/2017 - 6:20 AM  Chief Admitting Dx:  Pregnancy`  Labor and delivery, indication for care  Fetal intolerance to labor  Delivery Type:   for failure to progress, repeat  Post-Op/Post Partum Days #:  1    Subjective:  Abdominal pain: no  Ambulating:   yes  Tolerating liquids:  yes  Tolerating food:  yes common adult  Flatus:   yes  BM:    no  Bleeding:   with a small amount of bleeding  Voiding:   yes  Dizziness:   no  Feeding:   breast    Filed Vitals:    17 1800 17 2000 03/10/17 0000 03/10/17 0400   BP:  122/71 97/64 109/54   Pulse: 105 107 108 87   Temp:  36.6 °C (97.8 °F) 37 °C (98.6 °F) 36.7 °C (98.1 °F)   TempSrc:       Resp: 20 20 18 18   Height:       Weight:       SpO2: 96% 95% 95% 98%       Exam:  Breast: Tenderness no and Engorged no  Abdomen: Abdomen soft, non-tender. BS normal. No masses,  No organomegaly  Fundal Tenderness:  no  Fundus Firm: yes  Incision: no evidence of infection, separation or keloid formation.  Below umbilicus: yes  Perineum: perineum intact  Lochia: mild  Extremities: trace extremities, peripheral pulses and reflexes normal    Meds:  Current Facility-Administered Medications   Medication Dose   • ibuprofen (MOTRIN) tablet 600 mg  600 mg   • acetaminophen (TYLENOL) tablet 325 mg  325 mg   • oxycodone-acetaminophen (PERCOCET) 5-325 MG per tablet 1 Tab  1 Tab   • oxycodone immediate release (ROXICODONE) tablet 10 mg  10 mg   • morphine (pf) 4 mg/ml injection 4 mg  4 mg   • ondansetron (ZOFRAN) syringe/vial injection 4 mg  4 mg    Or   • ondansetron (ZOFRAN ODT) dispertab 4 mg  4 mg   • diphenhydrAMINE (BENADRYL) tablet/capsule 25 mg  25 mg    Or   • diphenhydrAMINE (BENADRYL) injection 25 mg  25 mg   • LR infusion     • PRN oxytocin (PITOCIN) (20 Units/1000 mL) PRN for excessive uterine bleeding - See Admin Instr  125-999 mL/hr   • misoprostol (CYTOTEC) tablet 600 mcg  600 mcg   •  methylergonovine (METHERGINE) injection 0.2 mg  0.2 mg   • simethicone (MYLICON) chewable tab 80 mg  80 mg   • prenatal plus vitamin (STUARTNATAL 1+1) 27-1 MG tablet 1 Tab  1 Tab   • misoprostol (CYTOTEC) tablet 800 mcg  800 mcg   • oxytocin (PITOCIN) 20 UNITS/1000ML LR (postpartum)   mL/hr   • docusate sodium (COLACE) capsule 100 mg  100 mg       Labs:   Recent Labs      17   1240   WBC  16.7*  20.7*   RBC  4.34  3.53*   HEMOGLOBIN  12.5  10.1*   HEMATOCRIT  38.6  31.9*   MCV  88.9  90.4   MCH  28.8  28.6   MCHC  32.4*  31.7*   RDW  46.1  46.7   PLATELETCT  200  167   MPV  9.3  9.3       Assessment:  Chief Admitting Dx:  Pregnancy`  Labor and delivery, indication for care  Fetal intolerance to labor  Delivery Type:   for failure to progress, repeat  Tubal Ligation:  no    Plan:  Continue routine post partum care. Advance care, encourage ambulation, pain control, start PO FeSO4 for asymptomatic anemia. Anticipate d/c home in 2 days, on POD#3.     KAUR Rocha.

## 2017-03-10 NOTE — PROGRESS NOTES
Dr. Jeremias Irene called per pt requesting colace.  New order received to give Colace 100mg PO BID PRN constipation.

## 2017-03-10 NOTE — PROGRESS NOTES
0705-Report received at bedside from Gabbie GARCIA, assumed care of patient. Encouraged to call with needs, denies at this time.   0920-Assessment completed, fundus is firm, lochia light and vital signs within defined parameters. Patient is ambulating and voiding without difficulty. Bonding well with infant, breastfeeding independently.  Discussed with patient pain medication plan, patient requesting to be medicated PRN, will call for medication.  Plan of care discussed, patient verbalized understanding. Hourly rounding implemented, call light within reach.

## 2017-03-10 NOTE — PROGRESS NOTES
Spoke with Judy from [facility] at 0800.  The phone number is [phone] and the case number is [id number].

## 2017-03-11 PROCEDURE — A9270 NON-COVERED ITEM OR SERVICE: HCPCS | Performed by: OBSTETRICS & GYNECOLOGY

## 2017-03-11 PROCEDURE — 700102 HCHG RX REV CODE 250 W/ 637 OVERRIDE(OP): Performed by: OBSTETRICS & GYNECOLOGY

## 2017-03-11 PROCEDURE — 770002 HCHG ROOM/CARE - OB PRIVATE (112)

## 2017-03-11 PROCEDURE — 700112 HCHG RX REV CODE 229: Performed by: OBSTETRICS & GYNECOLOGY

## 2017-03-11 RX ORDER — FERROUS SULFATE 325(65) MG
325 TABLET ORAL
Status: DISCONTINUED | OUTPATIENT
Start: 2017-03-11 | End: 2017-03-12 | Stop reason: HOSPADM

## 2017-03-11 RX ADMIN — Medication 325 MG: at 17:29

## 2017-03-11 RX ADMIN — IBUPROFEN 600 MG: 600 TABLET, FILM COATED ORAL at 14:31

## 2017-03-11 RX ADMIN — OXYCODONE HYDROCHLORIDE AND ACETAMINOPHEN 1 TABLET: 10; 325 TABLET ORAL at 08:15

## 2017-03-11 RX ADMIN — Medication 325 MG: at 11:09

## 2017-03-11 RX ADMIN — IBUPROFEN 600 MG: 600 TABLET, FILM COATED ORAL at 08:15

## 2017-03-11 RX ADMIN — OXYCODONE HYDROCHLORIDE AND ACETAMINOPHEN 1 TABLET: 5; 325 TABLET ORAL at 21:38

## 2017-03-11 RX ADMIN — IBUPROFEN 600 MG: 600 TABLET, FILM COATED ORAL at 02:21

## 2017-03-11 RX ADMIN — IBUPROFEN 600 MG: 600 TABLET, FILM COATED ORAL at 20:46

## 2017-03-11 RX ADMIN — OXYCODONE HYDROCHLORIDE AND ACETAMINOPHEN 1 TABLET: 10; 325 TABLET ORAL at 12:32

## 2017-03-11 RX ADMIN — Medication 325 MG: at 08:15

## 2017-03-11 RX ADMIN — DOCUSATE SODIUM 100 MG: 100 CAPSULE ORAL at 08:15

## 2017-03-11 RX ADMIN — SIMETHICONE CHEW TAB 80 MG 80 MG: 80 TABLET ORAL at 06:53

## 2017-03-11 RX ADMIN — OXYCODONE HYDROCHLORIDE AND ACETAMINOPHEN 1 TABLET: 10; 325 TABLET ORAL at 02:20

## 2017-03-11 RX ADMIN — Medication 1 TABLET: at 08:16

## 2017-03-11 RX ADMIN — OXYCODONE HYDROCHLORIDE AND ACETAMINOPHEN 1 TABLET: 10; 325 TABLET ORAL at 17:29

## 2017-03-11 ASSESSMENT — PAIN SCALES - GENERAL
PAINLEVEL_OUTOF10: 7
PAINLEVEL_OUTOF10: 4
PAINLEVEL_OUTOF10: 3
PAINLEVEL_OUTOF10: 6
PAINLEVEL_OUTOF10: 5
PAINLEVEL_OUTOF10: 5
PAINLEVEL_OUTOF10: 4
PAINLEVEL_OUTOF10: 2
PAINLEVEL_OUTOF10: 2
PAINLEVEL_OUTOF10: 5
PAINLEVEL_OUTOF10: 8
PAINLEVEL_OUTOF10: 6

## 2017-03-11 NOTE — PROGRESS NOTES
Post Partum Progress Note    Name:   Rowan Leos   Date/Time:  3/11/2017 - 6:41 AM  Chief Admitting Dx:  Pregnancy`  Labor and delivery, indication for care  Fetal intolerance to labor  Delivery Type:   for repeat, Failed TOLAC  Post-Op/Post Partum Days #:  2    Subjective:  Abdominal pain: yes  Ambulating:   yes  Tolerating liquids:  yes  Tolerating food:  yes common adult  Flatus:   yes  BM:    no  Bleeding:   with a small amount of bleeding  Voiding:   yes  Dizziness:   yes  Feeding:   breast    Filed Vitals:    03/10/17 0000 03/10/17 0400 03/10/17 0800 03/10/17 2000   BP: 97/64 109/54 98/62 114/71   Pulse: 108 87 92 98   Temp: 37 °C (98.6 °F) 36.7 °C (98.1 °F) 36.7 °C (98.1 °F) 36.9 °C (98.5 °F)   TempSrc:       Resp: 18 18 18 18   Height:       Weight:       SpO2: 95% 98% 94% 94%       Exam:  Breast: Tenderness no, Engorged yes and Lactating yes  Abdomen: Abdomen soft, non-tender. BS normal. No masses,  No organomegaly  Fundal Tenderness:  yes  Fundus Firm: yes  Incision: dry and intact, healing well with good reapproximation, no evidence of infection, separation or keloid formation.  Below umbilicus: yes  Perineum: perineum intact  Lochia: mild  Extremities: Normal extremities, peripheral pulses and reflexes normal    Meds:  Current Facility-Administered Medications   Medication Dose   • ibuprofen (MOTRIN) tablet 600 mg  600 mg   • acetaminophen (TYLENOL) tablet 325 mg  325 mg   • oxycodone-acetaminophen (PERCOCET) 5-325 MG per tablet 1 Tab  1 Tab   • oxycodone immediate release (ROXICODONE) tablet 10 mg  10 mg   • morphine (pf) 4 mg/ml injection 4 mg  4 mg   • ondansetron (ZOFRAN) syringe/vial injection 4 mg  4 mg    Or   • ondansetron (ZOFRAN ODT) dispertab 4 mg  4 mg   • diphenhydrAMINE (BENADRYL) tablet/capsule 25 mg  25 mg    Or   • diphenhydrAMINE (BENADRYL) injection 25 mg  25 mg   • ferrous sulfate tablet 325 mg  325 mg   • oxycodone-acetaminophen (PERCOCET-10)  MG per tablet 1 Tab   1 Tab   • LR infusion     • PRN oxytocin (PITOCIN) (20 Units/1000 mL) PRN for excessive uterine bleeding - See Admin Instr  125-999 mL/hr   • misoprostol (CYTOTEC) tablet 600 mcg  600 mcg   • methylergonovine (METHERGINE) injection 0.2 mg  0.2 mg   • simethicone (MYLICON) chewable tab 80 mg  80 mg   • prenatal plus vitamin (STUARTNATAL 1+1) 27-1 MG tablet 1 Tab  1 Tab   • oxytocin (PITOCIN) 20 UNITS/1000ML LR (postpartum)   mL/hr   • docusate sodium (COLACE) capsule 100 mg  100 mg       Labs:   Recent Labs      17   1240   WBC  16.7*  20.7*   RBC  4.34  3.53*   HEMOGLOBIN  12.5  10.1*   HEMATOCRIT  38.6  31.9*   MCV  88.9  90.4   MCH  28.8  28.6   MCHC  32.4*  31.7*   RDW  46.1  46.7   PLATELETCT  200  167   MPV  9.3  9.3       Assessment:  Chief Admitting Dx:  Pregnancy`  Labor and delivery, indication for care  Fetal intolerance to labor  Delivery Type:   for repeat, Failed   Tubal Ligation:  no    Plan:  Continue routine post partum care.  FeSo4     Chemo Ndiaye M.D.

## 2017-03-11 NOTE — PROGRESS NOTES
0715-Report received at bedside from Arlene RN, assumed care of patient. Encouraged to call with needs, denies at this time.   0830-Assessment completed, fundus is firm, lochia light and vital signs within defined parameters. Patient is ambulating and voiding without difficulty. Bonding well with infant, breastfeeding independently.  Discussed with patient pain medication plan, patient requesting to be medicated PRN, will call for medication.  Family at bedside.  Plan of care discussed, patient verbalized understanding. Hourly rounding implemented, call light within reach.

## 2017-03-11 NOTE — CARE PLAN
Problem: Altered physiologic condition related to postoperative  delivery  Goal: Patient physiologically stable as evidenced by normal lochia, palpable uterine involution and vital signs within normal limits  Outcome: PROGRESSING AS EXPECTED  Fundal massage done with light bleeding    Problem: Alteration in comfort related to surgical incision and/or after birth pains  Goal: Patient is able to ambulate, care for self and infant with acceptable pain level  Outcome: PROGRESSING AS EXPECTED  Pain medicine given as requested

## 2017-03-11 NOTE — PROGRESS NOTES
Pt doing well bonding with baby, assessment done wound open to air clean and dry with staples to skin, pt complained of moderate abdominal pain medicated her as per doctor orders, Ambulating well, needs attended.

## 2017-03-12 VITALS
BODY MASS INDEX: 32.6 KG/M2 | HEIGHT: 63 IN | OXYGEN SATURATION: 95 % | SYSTOLIC BLOOD PRESSURE: 112 MMHG | RESPIRATION RATE: 20 BRPM | HEART RATE: 86 BPM | WEIGHT: 184 LBS | TEMPERATURE: 97.7 F | DIASTOLIC BLOOD PRESSURE: 70 MMHG

## 2017-03-12 PROCEDURE — A9270 NON-COVERED ITEM OR SERVICE: HCPCS | Performed by: OBSTETRICS & GYNECOLOGY

## 2017-03-12 PROCEDURE — 700112 HCHG RX REV CODE 229: Performed by: OBSTETRICS & GYNECOLOGY

## 2017-03-12 PROCEDURE — 700102 HCHG RX REV CODE 250 W/ 637 OVERRIDE(OP): Performed by: OBSTETRICS & GYNECOLOGY

## 2017-03-12 RX ORDER — PSEUDOEPHEDRINE HCL 30 MG
100 TABLET ORAL 2 TIMES DAILY PRN
Qty: 60 CAP | Refills: 1 | Status: SHIPPED | OUTPATIENT
Start: 2017-03-12 | End: 2018-01-16

## 2017-03-12 RX ORDER — IBUPROFEN 600 MG/1
600 TABLET ORAL EVERY 6 HOURS PRN
Qty: 30 TAB | Refills: 1 | Status: SHIPPED | OUTPATIENT
Start: 2017-03-12 | End: 2017-03-18 | Stop reason: SDUPTHER

## 2017-03-12 RX ORDER — FERROUS SULFATE 325(65) MG
325 TABLET ORAL
Qty: 30 TAB | Refills: 0 | Status: SHIPPED | OUTPATIENT
Start: 2017-03-12 | End: 2018-01-16

## 2017-03-12 RX ORDER — OXYCODONE AND ACETAMINOPHEN 10; 325 MG/1; MG/1
1 TABLET ORAL EVERY 4 HOURS PRN
Qty: 30 TAB | Refills: 0 | Status: SHIPPED | OUTPATIENT
Start: 2017-03-12 | End: 2017-03-29

## 2017-03-12 RX ADMIN — OXYCODONE HYDROCHLORIDE AND ACETAMINOPHEN 1 TABLET: 5; 325 TABLET ORAL at 03:04

## 2017-03-12 RX ADMIN — OXYCODONE HYDROCHLORIDE 10 MG: 10 TABLET ORAL at 08:59

## 2017-03-12 RX ADMIN — DOCUSATE SODIUM 100 MG: 100 CAPSULE ORAL at 08:50

## 2017-03-12 RX ADMIN — Medication 325 MG: at 08:50

## 2017-03-12 RX ADMIN — Medication 1 TABLET: at 08:50

## 2017-03-12 RX ADMIN — IBUPROFEN 600 MG: 600 TABLET, FILM COATED ORAL at 12:15

## 2017-03-12 RX ADMIN — OXYCODONE HYDROCHLORIDE AND ACETAMINOPHEN 1 TABLET: 10; 325 TABLET ORAL at 15:56

## 2017-03-12 RX ADMIN — Medication 325 MG: at 12:15

## 2017-03-12 RX ADMIN — IBUPROFEN 600 MG: 600 TABLET, FILM COATED ORAL at 03:57

## 2017-03-12 ASSESSMENT — PAIN SCALES - GENERAL
PAINLEVEL_OUTOF10: 2
PAINLEVEL_OUTOF10: 7
PAINLEVEL_OUTOF10: 5
PAINLEVEL_OUTOF10: 2
PAINLEVEL_OUTOF10: 3
PAINLEVEL_OUTOF10: 1

## 2017-03-12 NOTE — PROGRESS NOTES
Received report from JOSE Joya. Patient in bed holding infant. Patient has friend and daughter in room with her.

## 2017-03-12 NOTE — CARE PLAN
Problem: Altered physiologic condition related to postoperative  delivery  Goal: Patient physiologically stable as evidenced by normal lochia, palpable uterine involution and vital signs within normal limits  Patient is physiologically stable. Patient has scant lochia and firm fundus. Vital signs are within normal parameters.

## 2017-03-12 NOTE — CONSULTS
Reviewed technique for deep, asymmetrical latch to prevent further nipple damage and facilitate healing. Milk increasing in volume, infant with audible swallows at breast. Aware of OP resources if needed.

## 2017-03-12 NOTE — PROGRESS NOTES
Through-shift while checking on the patient, patient would be asleep in bed with daughter and infant son. Each time this RN would take infant and [facility]     Patient mentioned that the percocet 5-325 did not feel like it was working as well as the percocet 10. However when this RN observed the patient falling asleep in bed with the infant multiple times and the patient being difficult to awake, this RN does not feel comfortable giving patient stronger pain medication. Patient rated her pain between 4-6 through out the shift.     This RN asked patient if she wanted infant to go to the Nursery while she slept. Patient declined.

## 2017-03-12 NOTE — PROGRESS NOTES
Staples removed, steri strips placed over incision site. Patient educated on care of incision, verbalized understanding. Patient tolerated procedure well.

## 2017-03-12 NOTE — DISCHARGE SUMMARY
Discharge Summary:      Rowan Leos      Admit Date:   3/8/2017  Discharge Date:  3/12/2017     Admitting diagnosis:  Pregnancy`  Labor and delivery, indication for care  Fetal intolerance to labor  Discharge Diagnosis: Status post  for fetal distress, repeat.  Pregnancy Complications: fibromyalgia, hx C/S  Tubal Ligation:  no        History:  Past Medical History   Diagnosis Date   • Fibromyalgia    • Arthritis    • Headache, classical migraine    • Migraines    • Supervision of other high risk pregnancy, antepartum 2017     OB History    Para Term  AB SAB TAB Ectopic Multiple Living   3 1 1  1 1    1      # Outcome Date GA Lbr Last/2nd Weight Sex Delivery Anes PTL Lv   3 Current            2 Term 14    F CS-Unspec   Y   1 SAB  8w0d                  Ortho tri-cyclen  Patient Active Problem List    Diagnosis Date Noted   • Labor and delivery, indication for care 2017   • Supervision of other high risk pregnancy, antepartum 2017   • Previous  delivery, antepartum 10/17/2016   • Fibromyalgia 2014   • Arthritis 2014   • Hx of migraine headaches 2014        Hospital Course:   21 y.o. , now para 2, was admitted with the above mentioned diagnosis, underwent Repeat  fetal distress, repeat,  for fetal distress, repeat. Patient postpartum course was unremarkable, with progressive advancement in diet , ambulation and toleration of oral analgesia. Patient without complaints today and desires discharge.      Filed Vitals:    03/10/17 0800 03/10/17 2000 03/11/17 0800 17   BP: 98/62 114/71 99/56 110/74   Pulse: 92 98 80 104   Temp: 36.7 °C (98.1 °F) 36.9 °C (98.5 °F) 36.7 °C (98 °F) 36.9 °C (98.5 °F)   TempSrc:       Resp: 18 18 16 18   Height:       Weight:       SpO2: 94% 94% 99% 96%       Current Facility-Administered Medications   Medication Dose   • ferrous sulfate tablet 325 mg  325 mg   • ibuprofen (MOTRIN)  tablet 600 mg  600 mg   • acetaminophen (TYLENOL) tablet 325 mg  325 mg   • oxycodone-acetaminophen (PERCOCET) 5-325 MG per tablet 1 Tab  1 Tab   • oxycodone immediate release (ROXICODONE) tablet 10 mg  10 mg   • morphine (pf) 4 mg/ml injection 4 mg  4 mg   • ondansetron (ZOFRAN) syringe/vial injection 4 mg  4 mg    Or   • ondansetron (ZOFRAN ODT) dispertab 4 mg  4 mg   • diphenhydrAMINE (BENADRYL) tablet/capsule 25 mg  25 mg    Or   • diphenhydrAMINE (BENADRYL) injection 25 mg  25 mg   • oxycodone-acetaminophen (PERCOCET-10)  MG per tablet 1 Tab  1 Tab   • LR infusion     • PRN oxytocin (PITOCIN) (20 Units/1000 mL) PRN for excessive uterine bleeding - See Admin Instr  125-999 mL/hr   • misoprostol (CYTOTEC) tablet 600 mcg  600 mcg   • methylergonovine (METHERGINE) injection 0.2 mg  0.2 mg   • simethicone (MYLICON) chewable tab 80 mg  80 mg   • prenatal plus vitamin (STUARTNATAL 1+1) 27-1 MG tablet 1 Tab  1 Tab   • oxytocin (PITOCIN) 20 UNITS/1000ML LR (postpartum)   mL/hr   • docusate sodium (COLACE) capsule 100 mg  100 mg       Exam:  Breast Exam: negative  Abdomen: Abdomen soft, non-tender. BS normal. No masses,  No organomegaly  Fundus Non Tender: yes  Incision: no evidence of infection, separation or keloid formation.  Perineum: perineum intact  Extremity: extremities, peripheral pulses and reflexes normal     Labs:  Recent Labs      03/09/17   1240   WBC  20.7*   RBC  3.53*   HEMOGLOBIN  10.1*   HEMATOCRIT  31.9*   MCV  90.4   MCH  28.6   MCHC  31.7*   RDW  46.7   PLATELETCT  167   MPV  9.3        Activity:   Discharge to home  Pelvic Rest x 6 weeks    Assessment:  normal postpartum course  Discharge Assessment: No areas of skin breakdown/redness; surgical incision intact/healing     Follow up: .[facility] or [facility] in 5 weeks for vaginal, 1 wk for C/S check.      Discharge Meds:   Current Outpatient Prescriptions   Medication Sig Dispense Refill   • oxycodone-acetaminophen (PERCOCET-10)   MG Tab Take 1 Tab by mouth every four hours as needed for Severe Pain (Pain scale 7-10). 30 Tab 0   • ibuprofen (MOTRIN) 600 MG Tab Take 1 Tab by mouth every 6 hours as needed (For cramping after delivery; do not give if patient is receiving ketorolac (Toradol)). 30 Tab 1   • ferrous sulfate 325 (65 FE) MG tablet Take 1 Tab by mouth 3 times a day, with meals. 30 Tab 0   • docusate sodium 100 MG Cap Take 100 mg by mouth 2 times a day as needed for Constipation. 60 Cap 1       JEANINE RochaA.

## 2017-03-12 NOTE — DISCHARGE INSTRUCTIONS
POSTPARTUM DISCHARGE INSTRUCTIONS FOR MOM    YOB: 1995   Age: 21 y.o.               Admit Date: 3/8/2017     Discharge Date: 3/12/2017  Attending Doctor:  Chemo Ndiaye M.D.                  Allergies:  Ortho tri-cyclen    Discharged to home by car. Discharged via wheelchair, hospital escort: Yes.  Special equipment needed: Not Applicable  Belongings with: Personal  Be sure to schedule a follow-up appointment with your primary care doctor or any specialists as instructed.     Discharge Plan:   Diet Plan: Discussed  Activity Level: Discussed  Confirmed Follow up Appointment: Patient to Call and Schedule Appointment  Confirmed Symptoms Management: Discussed  Medication Reconciliation Updated: Yes  Influenza Vaccine Indication: Not indicated: Previously immunized this influenza season and > 8 years of age    REASONS TO CALL YOUR OBSTETRICIAN:  1.   Persistent fever or shaking chills (Temperature higher than 100.4)  2.   Heavy bleeding (soaking more than 1 pad per hour); Passing clots  3.   Foul odor from vagina  4.   Mastitis (Breast infection; breast pain, chills, fever, redness)  5.   Urinary pain, burning or frequency  6.   Episiotomy infection  7.   Abdominal incision infection  8.   Severe depression longer than 24 hours    HAND WASHING  · Prior to handling the baby.  · Before breastfeeding or bottle feeding baby.  · After using the bathroom or changing the baby's diaper.    WOUND CARE  Ask your physician for additional care instructions.  In general:    ·  Incision:      · Keep clean and dry.    · Do NOT lift anything heavier than your baby for up to 6 weeks.    · There should not be any opening or pus.      VAGINAL CARE  · Nothing inside vagina for 6 weeks: no sexual intercourse, tampons or douching.  · Bleeding may continue for 2-4 weeks.  Amount may vary.    · Call your physician for heavy bleeding which means soaking more than 1 pad per hour    BIRTH CONTROL  · It is possible to become  "pregnant at any time after delivery and while breastfeeding.  · Plan to discuss a method of birth control with your physician at your follow up visit. visit.    DIET AND ELIMINATION  · Eating more fiber (bran cereal, fruits, and vegetables) and drinking plenty of fluids will help to avoid constipation.  · Urinary frequency after childbirth is normal.    POSTPARTUM BLUES  During the first few days after birth, you may experience a sense of the \"blues\" which may include impatience, irritability or even crying.  These feeling come and go quickly.  However, as many as 1 in 10 women experience emotional symptoms known as postpartum depression.    Postpartum depression:  May start as early as the second or third day after delivery or take several weeks or months to develop.  Symptoms of \"blues\" are present, but are more intense:  Crying spells; loss of appetite; feelings of hopelessness or loss of control; fear of touching the baby; over concern or no concern at all about the baby; little or no concern about your own appearance/caring for yourself; and/or inability to sleep or excessive sleeping.  Contact your physician if you are experiencing any of these symptoms.    Crisis Hotline:  · [place] Crisis Hotline:  7-803-FVHPYUV  Or 1-216.179.1188  · [place] Crisis Hotline:  1-728.217.1691  Or 216-878-9327    PREVENTING SHAKEN BABY:  If you are angry or stressed, PUT THE BABY IN THE CRIB, step away, take some deep breaths, and wait until you are calm to care for the baby.  DO NOT SHAKE THE BABY.  You are not alone, call a supporter for help.    · Crisis Call Center 24/7 crisis line 363-884-9372 or 1-747.807.5519  · You can also text them, text \"ANSWER\" to 056373    QUIT SMOKING/TOBACCO USE:  I understand the use of any tobacco products increases my chance of suffering from future heart disease and could cause other illnesses which may shorten my life. Quitting the use of tobacco products is the single most important thing I " can do to improve my health. For further information on smoking / tobacco cessation call a Toll Free Quit Line at 1-660.310.4384 (*[employer] [place]) or 1-538.576.9468 (American Lung Association) or you can access the web based program at www.lungusa.org.    · [place] Tobacco Users Help Line:  (265) 942-8383       Toll Free: 1-446.108.7988  · Quit Tobacco Program [facility] (311)243-3019    DEPRESSION / SUICIDE RISK:  As you are discharged from this [facility], it is important to learn how to keep safe from harming yourself.    Recognize the warning signs:  · Abrupt changes in personality, positive or negative- including increase in energy   · Giving away possessions  · Change in eating patterns- significant weight changes-  positive or negative  · Change in sleeping patterns- unable to sleep or sleeping all the time   · Unwillingness or inability to communicate  · Depression  · Unusual sadness, discouragement and loneliness  · Talk of wanting to die  · Neglect of personal appearance   · Rebelliousness- reckless behavior  · Withdrawal from people/activities they love  · Confusion- inability to concentrate     If you or a loved one observes any of these behaviors or has concerns about self-harm, here's what you can do:  · Talk about it- your feelings and reasons for harming yourself  · Remove any means that you might use to hurt yourself (examples: pills, rope, extension cords, firearm)  · Get professional help from the community (Mental Health, Substance Abuse, psychological counseling)  · Do not be alone:Call your Safe Contact- someone whom you trust who will be there for you.  · Call your local CRISIS HOTLINE 608-5960 or 386-591-0477  · Call your local Children's Mobile Crisis Response Team [place] (605) 946-3936 or www.[employer]  · Call the toll free National Suicide Prevention Hotlines   · National Suicide Prevention Lifeline 365-677-UIEO (8655)  · National  [facility] 800-SUICIDE (850-8247)    DISCHARGE SURVEY:  Thank you for choosing [facility].  We hope we provided you with very good care.  You may be receiving a survey in the mail.  Please fill it out.  Your opinion is valuable to us.    ADDITIONAL EDUCATIONAL MATERIALS GIVEN TO PATIENT:        My signature on this form indicates that:  1.  I have reviewed and understand the above information  2.  My questions regarding this information have been answered to my satisfaction.  3.  I have formulated a plan with my discharge nurse to obtain my prescribed medication for home.

## 2017-03-13 NOTE — PROGRESS NOTES
Patient in stable condition for discharge, all questions answered, taken out in wheelchair by Milagros BUSTOS, accompanied by FOB.

## 2017-03-18 ENCOUNTER — OFFICE VISIT (OUTPATIENT)
Dept: URGENT CARE | Facility: PHYSICIAN GROUP | Age: 22
End: 2017-03-18
Payer: COMMERCIAL

## 2017-03-18 VITALS
WEIGHT: 180 LBS | BODY MASS INDEX: 31.89 KG/M2 | SYSTOLIC BLOOD PRESSURE: 122 MMHG | HEART RATE: 88 BPM | OXYGEN SATURATION: 98 % | HEIGHT: 63 IN | DIASTOLIC BLOOD PRESSURE: 80 MMHG | TEMPERATURE: 97.8 F

## 2017-03-18 DIAGNOSIS — Z98.891 S/P CESAREAN SECTION: ICD-10-CM

## 2017-03-18 DIAGNOSIS — L76.82 INCISIONAL PAIN: ICD-10-CM

## 2017-03-18 PROCEDURE — 99203 OFFICE O/P NEW LOW 30 MIN: CPT | Performed by: PHYSICIAN ASSISTANT

## 2017-03-18 RX ORDER — IBUPROFEN 600 MG/1
600 TABLET ORAL EVERY 6 HOURS PRN
Qty: 30 TAB | Refills: 1 | Status: SHIPPED | OUTPATIENT
Start: 2017-03-18 | End: 2018-01-16

## 2017-03-18 ASSESSMENT — ENCOUNTER SYMPTOMS
NAUSEA: 0
ABDOMINAL PAIN: 1
BLOOD IN STOOL: 0
CHILLS: 0
DIARRHEA: 0
VOMITING: 0
FEVER: 0
CONSTIPATION: 0

## 2017-03-18 NOTE — PROGRESS NOTES
Subjective:      Rowan Leos is a 21 y.o. female who presents with Other            Other  Associated symptoms include abdominal pain ( mild only periincisional). Pertinent negatives include no chills, fever, nausea or vomiting.   notes is now 9d s/p csection, every 4hrs taking 10 percocet and ibu every 6hrs, notes pain w/ incision, has been scheduling meds as able, c/o pain if falls asleep, will be running out of meds in another few days at this rate, concerned w/ oozing bleeding from incision. Pt w/ PMH of fibromyalgia, counseled by OB that will have more post op pain. Was told prior to checking in no narcotics from UC today. She denies fever/chills, c/o mild pain around incsion, notes has been regularly taking percocet 10;s and will run out prior to f/u w/ OB on Tuesday. Wants some bleeding to incision checked as well. Denies dramatic pain, c/o mild upper abd cramping. Denies nausea/vomiting. Candi have a two year old and needs to be more active than would like.    Review of Systems   Constitutional: Negative for fever and chills.   Gastrointestinal: Positive for abdominal pain ( mild only periincisional). Negative for nausea, vomiting, diarrhea, constipation and blood in stool.       PMH:  has a past medical history of Fibromyalgia; Arthritis; Headache, classical migraine; Migraines; and Supervision of other high risk pregnancy, antepartum (2/14/2017).  MEDS:   Current outpatient prescriptions:   •  oxycodone-acetaminophen (PERCOCET-10)  MG Tab, Take 1 Tab by mouth every four hours as needed for Severe Pain (Pain scale 7-10)., Disp: 30 Tab, Rfl: 0  •  ibuprofen (MOTRIN) 600 MG Tab, Take 1 Tab by mouth every 6 hours as needed (For cramping after delivery; do not give if patient is receiving ketorolac (Toradol))., Disp: 30 Tab, Rfl: 1  •  ferrous sulfate 325 (65 FE) MG tablet, Take 1 Tab by mouth 3 times a day, with meals., Disp: 30 Tab, Rfl: 0  •  docusate sodium 100 MG Cap, Take 100 mg by mouth 2 times  "a day as needed for Constipation., Disp: 60 Cap, Rfl: 1  •  Prenatal MV-Min-Fe Fum-FA-DHA (PRENATAL 1 PO), Take  by mouth., Disp: , Rfl:   ALLERGIES:   Allergies   Allergen Reactions   • Ortho Tri-Cyclen [Norgestimate-Ethinyl Estradiol] Anaphylaxis     Ortho tri-cyclen lo     SURGHX:   Past Surgical History   Procedure Laterality Date   • Other orthopedic surgery     • Primary c section  9/14/2014     Performed by Rebekah Rodriguez M.D. at LABOR AND DELIVERY   • Repeat c section  3/9/2017     Procedure: REPEAT C SECTION;  Surgeon: Hemanth Cisneros M.D.;  Location: LABOR AND DELIVERY;  Service:      SOCHX:  reports that she has quit smoking. She started smoking about 5 years ago. She has never used smokeless tobacco. She reports that she uses illicit drugs. She reports that she does not drink alcohol.  FH: Family history was reviewed, no pertinent findings to report    I have worn a mask for the entire encounter with this patient.      Objective:     /80 mmHg  Pulse 88  Temp(Src) 36.6 °C (97.8 °F)  Ht 1.6 m (5' 3\")  Wt 81.647 kg (180 lb)  BMI 31.89 kg/m2  SpO2 98%     Physical Exam   Constitutional: She is oriented to person, place, and time. She appears well-developed and well-nourished. No distress.   HENT:   Head: Normocephalic and atraumatic.   Right Ear: External ear normal.   Left Ear: External ear normal.   Nose: Nose normal.   Eyes: Conjunctivae and lids are normal. Right eye exhibits no discharge. Left eye exhibits no discharge. No scleral icterus.   Neck: Neck supple.   Pulmonary/Chest: Effort normal. No respiratory distress.   Abdominal: Soft. Bowel sounds are normal. There is tenderness ( mild periincisional). There is no rigidity, no rebound, no guarding, no CVA tenderness, no tenderness at McBurney's point and negative Rosenberg's sign.   Incision c/w csection healing well, no appearance of erythema, all steri strips remain in place, no edema/fluctuance/ecchymosis, intact - appears to be healing well "   Musculoskeletal: Normal range of motion.   Neurological: She is alert and oriented to person, place, and time. She is not disoriented.   Skin: Skin is warm and dry. She is not diaphoretic. No erythema. No pallor.   Psychiatric: Her speech is normal and behavior is normal.   Nursing note and vitals reviewed.    [facility] Aware web site evaluation: I have obtained and reviewed patient utilization report from [facility] pharmacy database prior to writing prescription for controlled substance II, III or IV per [place] bill .     Report for this patient demonstrates pt has been given Rx for percocet 10/325 x 30 6d ago from surgeon.          Assessment/Plan:   1. Incisional pain  I discuss that I do not feel comfortable refilling her percocet 10/325mg narcotic pain med since she so recently was given some (6d prior) and that she should contact her surgical team w/ concerns regarding surgical pain and additional narcotics this soon, she is sent w/ refill of ibu 600 and encouraged to contact surgical team w/ concerns for pain, no s/sx of incisional infection at this time, appears to be healing well    - ibuprofen (MOTRIN) 600 MG Tab; Take 1 Tab by mouth every 6 hours as needed (For cramping after delivery; do not give if patient is receiving ketorolac (Toradol)).  Dispense: 30 Tab; Refill: 1    2. S/P  section

## 2017-03-18 NOTE — MR AVS SNAPSHOT
"        Rowan Leos   3/18/2017 1:30 PM   Office Visit   MRN: [id number]    Department:  [facility]   Dept Phone:  [phone]    Description:  Female : 1995   Provider:  Simone Gonsales PA-C           Reason for Visit     Other py csection is bleeding and is having sharp abdominal pain x2days       Allergies as of 3/18/2017     Allergen Noted Reactions    Ortho Tri-Cyclen [Norgestimate-Ethinyl Estradiol] 2010   Anaphylaxis    Ortho tri-cyclen lo      You were diagnosed with     Incisional pain   [679617]       S/P  section   [364227]         Vital Signs     Blood Pressure Pulse Temperature Height Weight Body Mass Index    122/80 mmHg 88 36.6 °C (97.8 °F) 1.6 m (5' 3\") 81.647 kg (180 lb) 31.89 kg/m2    Oxygen Saturation Smoking Status                98% Former Smoker          Basic Information     Date Of Birth Sex Race Ethnicity Preferred Language    1995 Female White Non- English      Your appointments     Mar 21, 2017 11:30 AM   Post Partum / Surgery with Chemo Ndiaye M.D.   [facility] ([address])    [address] [place] [address], [address]  [facility] [address] [phone]              Problem List              ICD-10-CM Priority Class Noted - Resolved    Fibromyalgia M79.7   9/3/2014 - Present    Arthritis M19.90   9/3/2014 - Present    Hx of migraine headaches Z86.69   9/3/2014 - Present    Previous  delivery, antepartum O34.219   10/17/2016 - Present    Supervision of other high risk pregnancy, antepartum O09.899   2017 - Present    Labor and delivery, indication for care O75.9   3/8/2017 - Present      Health Maintenance        Date Due Completion Dates    IMM HEP B VACCINE (1 of 3 - Primary Series) 1993 ---    IMM HEP A VACCINE (1 of 2 - Standard Series) 1994 ---    IMM HPV VACCINE (1 of 3 - Female 3 Dose Series) 2004 ---    IMM VARICELLA (CHICKENPOX) VACCINE (1 of 2 - 2 Dose Adolescent Series) 2006 ---   " PAP SMEAR 3/25/2019 3/25/2016, 3/25/2016 (Done), 2/7/2014    Override on 3/25/2016: Done    IMM DTaP/Tdap/Td Vaccine (3 - Td) 12/13/2026 12/13/2016, 9/14/2014            Current Immunizations     Influenza Vaccine Quad Inj (Pf) 12/2/2016    MMR/Varicella Combined Vaccine  Incomplete    Tdap Vaccine 12/13/2016,  Incomplete, 9/14/2014 11:30 PM      Below and/or attached are the medications your provider expects you to take. Review all of your home medications and newly ordered medications with your provider and/or pharmacist. Follow medication instructions as directed by your provider and/or pharmacist. Please keep your medication list with you and share with your provider. Update the information when medications are discontinued, doses are changed, or new medications (including over-the-counter products) are added; and carry medication information at all times in the event of emergency situations     Allergies:  ORTHO TRI-CYCLEN - Anaphylaxis               Medications  Valid as of: March 18, 2017 -  3:46 PM    Generic Name Brand Name Tablet Size Instructions for use    Docusate Sodium (Cap)  MG Take 100 mg by mouth 2 times a day as needed for Constipation.        Ferrous Sulfate (Tab) ferrous sulfate 325 (65 FE) MG Take 1 Tab by mouth 3 times a day, with meals.        Ibuprofen (Tab) MOTRIN 600 MG Take 1 Tab by mouth every 6 hours as needed (For cramping after delivery; do not give if patient is receiving ketorolac (Toradol)).        Oxycodone-Acetaminophen (Tab) PERCOCET-10  MG Take 1 Tab by mouth every four hours as needed for Severe Pain (Pain scale 7-10).        Prenatal MV-Min-Fe Fum-FA-DHA   Take  by mouth.        .                 Medicines prescribed today were sent to:     [id number] - [place] - [address] AT [facility] [address]    [address] [address]    Phone: [phone] Fax: [phone]    Open 24 Hours?: No      Medication refill  instructions:       If your prescription bottle indicates you have medication refills left, it is not necessary to call your provider’s office. Please contact your pharmacy and they will refill your medication.    If your prescription bottle indicates you do not have any refills left, you may request refills at any time through one of the following ways: The online TIME PLUS Q system (except Urgent Care), by calling your provider’s office, or by asking your pharmacy to contact your provider’s office with a refill request. Medication refills are processed only during regular business hours and may not be available until the next business day. Your provider may request additional information or to have a follow-up visit with you prior to refilling your medication.   *Please Note: Medication refills are assigned a new Rx number when refilled electronically. Your pharmacy may indicate that no refills were authorized even though a new prescription for the same medication is available at the pharmacy. Please request the medicine by name with the pharmacy before contacting your provider for a refill.        Other Notes About Your Plan     Sees Acupuncturist for Fibromyalgia   Review of Op note: 5cm , late decelerations , Low transverse , single layer only   Desires TOLAC           TIME PLUS Q Access Code: [email or web address]  Expires: 4/17/2017  3:46 PM    TIME PLUS Q  A secure, online tool to manage your health information     [employer]’s TIME PLUS Q® is a secure, online tool that connects you to your personalized health information from the privacy of your home -- day or night - making it very easy for you to manage your healthcare. Once the activation process is completed, you can even access your medical information using the TIME PLUS Q damian, which is available for free in the Apple Damian store or Google Play store.     TIME PLUS Q provides the following levels of access (as shown below):   My Chart Features   [facility] Primary Care Doctor  [facility]  Specialists [facility]  Urgent  Care Non-[facility]  Primary Care  Doctor   Email your healthcare team securely and privately 24/7 X X X    Manage appointments: schedule your next appointment; view details of past/upcoming appointments X      Request prescription refills. X      View recent personal medical records, including lab and immunizations X X X X   View health record, including health history, allergies, medications X X X X   Read reports about your outpatient visits, procedures, consult and ER notes X X X X   See your discharge summary, which is a recap of your hospital and/or ER visit that includes your diagnosis, lab results, and care plan. X X       How to register for [employer] Mobile:  1. Go to  [email or web address].  2. Click on the Sign Up Now box, which takes you to the New Member Sign Up page. You will need to provide the following information:  a. Enter your Copper Mobile Access Code exactly as it appears at the top of this page. (You will not need to use this code after you’ve completed the sign-up process. If you do not sign up before the expiration date, you must request a new code.)   b. Enter your date of birth.   c. Enter your home email address.   d. Click Submit, and follow the next screen’s instructions.  3. Create a Copper Mobile ID. This will be your Copper Mobile login ID and cannot be changed, so think of one that is secure and easy to remember.  4. Create a Copper Mobile password. You can change your password at any time.  5. Enter your Password Reset Question and Answer. This can be used at a later time if you forget your password.   6. Enter your e-mail address. This allows you to receive e-mail notifications when new information is available in Copper Mobile.  7. Click Sign Up. You can now view your health information.    For assistance activating your Copper Mobile account, call [phone]

## 2017-03-21 ENCOUNTER — POST PARTUM (OUTPATIENT)
Dept: OBGYN | Facility: CLINIC | Age: 22
End: 2017-03-21
Payer: COMMERCIAL

## 2017-03-21 VITALS
DIASTOLIC BLOOD PRESSURE: 68 MMHG | SYSTOLIC BLOOD PRESSURE: 118 MMHG | HEIGHT: 63 IN | WEIGHT: 163 LBS | BODY MASS INDEX: 28.88 KG/M2

## 2017-03-21 DIAGNOSIS — Z09 POSTOP CHECK: ICD-10-CM

## 2017-03-21 PROCEDURE — 99024 POSTOP FOLLOW-UP VISIT: CPT | Performed by: OBSTETRICS & GYNECOLOGY

## 2017-03-21 RX ORDER — OXYCODONE HYDROCHLORIDE AND ACETAMINOPHEN 5; 325 MG/1; MG/1
1-2 TABLET ORAL EVERY 4 HOURS PRN
Qty: 15 TAB | Refills: 0 | Status: SHIPPED | OUTPATIENT
Start: 2017-03-21 | End: 2018-01-16

## 2017-03-21 NOTE — MR AVS SNAPSHOT
"        Rowan Leos   3/21/2017 11:30 AM   Post Partum   MRN: [id number]    Department:  [facility]   Dept Phone:  [phone]    Description:  Female : 1995   Provider:  Chemo Ndiaye M.D.           Allergies as of 3/21/2017     Allergen Noted Reactions    Ortho Tri-Cyclen [Norgestimate-Ethinyl Estradiol] 2010   Anaphylaxis    Ortho tri-cyclen lo      You were diagnosed with     Postop check   [759481]         Vital Signs     Blood Pressure Height Weight Body Mass Index Breastfeeding? Smoking Status    118/68 mmHg 1.6 m (5' 2.99\") 73.936 kg (163 lb) 28.88 kg/m2 Yes Former Smoker      Basic Information     Date Of Birth Sex Race Ethnicity Preferred Language    1995 Female White Non- English      Your appointments     Mar 31, 2017 11:30 AM   Post Partum / Surgery with Chemo Ndiaye M.D.   [facility] [address], [address]  [place] [id number] [phone]              Problem List              ICD-10-CM Priority Class Noted - Resolved    Fibromyalgia M79.7   9/3/2014 - Present    Arthritis M19.90   9/3/2014 - Present    Hx of migraine headaches Z86.69   9/3/2014 - Present    Previous  delivery, antepartum O34.219   10/17/2016 - Present    Supervision of other high risk pregnancy, antepartum O09.899   2017 - Present    Labor and delivery, indication for care O75.9   3/8/2017 - Present      Health Maintenance        Date Due Completion Dates    IMM HEP B VACCINE (1 of 3 - Primary Series) 1993 ---    IMM HEP A VACCINE (1 of 2 - Standard Series) 1994 ---    IMM HPV VACCINE (1 of 3 - Female 3 Dose Series) 2004 ---    IMM VARICELLA (CHICKENPOX) VACCINE (1 of 2 - 2 Dose Adolescent Series) 2006 ---    PAP SMEAR 3/25/2019 3/25/2016, 3/25/2016 (Done), 2014    Override on 3/25/2016: Done    IMM DTaP/Tdap/Td Vaccine (3 - Td) 2026, 2014            Current Immunizations    " Influenza Vaccine Quad Inj (Pf) 12/2/2016    MMR/Varicella Combined Vaccine  Incomplete    Tdap Vaccine 12/13/2016,  Incomplete, 9/14/2014 11:30 PM      Below and/or attached are the medications your provider expects you to take. Review all of your home medications and newly ordered medications with your provider and/or pharmacist. Follow medication instructions as directed by your provider and/or pharmacist. Please keep your medication list with you and share with your provider. Update the information when medications are discontinued, doses are changed, or new medications (including over-the-counter products) are added; and carry medication information at all times in the event of emergency situations     Allergies:  ORTHO TRI-CYCLEN - Anaphylaxis               Medications  Valid as of: March 21, 2017 - 12:19 PM    Generic Name Brand Name Tablet Size Instructions for use    Docusate Sodium (Cap)  MG Take 100 mg by mouth 2 times a day as needed for Constipation.        Ferrous Sulfate (Tab) ferrous sulfate 325 (65 FE) MG Take 1 Tab by mouth 3 times a day, with meals.        Ibuprofen (Tab) MOTRIN 600 MG Take 1 Tab by mouth every 6 hours as needed (For cramping after delivery; do not give if patient is receiving ketorolac (Toradol)).        Oxycodone-Acetaminophen (Tab) PERCOCET-10  MG Take 1 Tab by mouth every four hours as needed for Severe Pain (Pain scale 7-10).        Oxycodone-Acetaminophen (Tab) PERCOCET 5-325 MG Take 1-2 Tabs by mouth every four hours as needed for Moderate Pain.        Prenatal MV-Min-Fe Fum-FA-DHA   Take  by mouth.        .                 Medicines prescribed today were sent to:     [id number] - [place] - [address] AT [facility] OF [address]    [address] [address]    Phone: [phone] Fax: [phone]    Open 24 Hours?: No      Medication refill instructions:       If your prescription bottle indicates you have medication  refills left, it is not necessary to call your provider’s office. Please contact your pharmacy and they will refill your medication.    If your prescription bottle indicates you do not have any refills left, you may request refills at any time through one of the following ways: The online TalkShoe system (except Urgent Care), by calling your provider’s office, or by asking your pharmacy to contact your provider’s office with a refill request. Medication refills are processed only during regular business hours and may not be available until the next business day. Your provider may request additional information or to have a follow-up visit with you prior to refilling your medication.   *Please Note: Medication refills are assigned a new Rx number when refilled electronically. Your pharmacy may indicate that no refills were authorized even though a new prescription for the same medication is available at the pharmacy. Please request the medicine by name with the pharmacy before contacting your provider for a refill.        Other Notes About Your Plan     Sees Acupuncturist for Fibromyalgia   Review of Op note: 5cm , late decelerations , Low transverse , single layer only   Desires TOLAC           [employer] Access Code: Activation code not generated  Current ihijit Status: Active

## 2017-03-22 NOTE — PROGRESS NOTES
"SUBJECTIVE:  Rowan Leos presents to the clinic 1 weeks following C/S for FailedVBAC    Eating a regular diet without difficulty. Bowel movement are Normal.  The patient is not having any pain. Spotting. Patient Denies Incisional pain, drainage or redness    OBJECTIVE:  /68 mmHg  Ht 1.6 m (5' 2.99\")  Wt 73.936 kg (163 lb)  BMI 28.88 kg/m2  Breastfeeding? Yes  Current Outpatient Prescriptions on File Prior to Visit   Medication Sig Dispense Refill   • ibuprofen (MOTRIN) 600 MG Tab Take 1 Tab by mouth every 6 hours as needed (For cramping after delivery; do not give if patient is receiving ketorolac (Toradol)). 30 Tab 1   • oxycodone-acetaminophen (PERCOCET-10)  MG Tab Take 1 Tab by mouth every four hours as needed for Severe Pain (Pain scale 7-10). 30 Tab 0   • ferrous sulfate 325 (65 FE) MG tablet Take 1 Tab by mouth 3 times a day, with meals. 30 Tab 0   • docusate sodium 100 MG Cap Take 100 mg by mouth 2 times a day as needed for Constipation. 60 Cap 1   • Prenatal MV-Min-Fe Fum-FA-DHA (PRENATAL 1 PO) Take  by mouth.       No current facility-administered medications on file prior to visit.       Constitutional:  alert, no distress.  Abdomen:  soft, bowel sounds active, non-tender.  Incision:  healing well, no drainage, no erythema, no hernia, no seroma, no swelling, with Steri Strips , no dehiscence, incision well approximated.    IMPRESSION: Doing well postoperatively.  Pt is to increase activities as tolerated.    Lab:   Recent Results (from the past 1008 hour(s))   FERN TEST    Collection Time: 03/03/17  6:00 AM   Result Value Ref Range    Fern Test On Amniotic Fluid see below Not present   POCT Fetal Nonstress Test    Collection Time: 03/06/17  9:46 AM   Result Value Ref Range    NST Indications post term     NST Baseline 125     NST Uterine Activity no     NST Acoustic Stimulation no     NST Assessment reactive     NST Action Necessary      NST Other Data      NST Return      NST Read By " Senthil    POCT Fetal Nonstress Test    Collection Time: 03/08/17 10:51 AM   Result Value Ref Range    NST Indications Post Term     NST Baseline 130     NST Uterine Activity no     NST Acoustic Stimulation no     NST Assessment reactive     NST Action Necessary      NST Other Data      NST Return      NST Read By Senthil Pineda Blood Bank Specimen (Not Tested)    Collection Time: 03/08/17  7:55 PM   Result Value Ref Range    Holding Tube - Bb DONE    CBC WITH DIFFERENTIAL    Collection Time: 03/08/17  7:55 PM   Result Value Ref Range    WBC 16.7 (H) 4.8 - 10.8 K/uL    RBC 4.34 4.20 - 5.40 M/uL    Hemoglobin 12.5 12.0 - 16.0 g/dL    Hematocrit 38.6 37.0 - 47.0 %    MCV 88.9 81.4 - 97.8 fL    MCH 28.8 27.0 - 33.0 pg    MCHC 32.4 (L) 33.6 - 35.0 g/dL    RDW 46.1 35.9 - 50.0 fL    Platelet Count 200 164 - 446 K/uL    MPV 9.3 9.0 - 12.9 fL    Neutrophils-Polys 80.20 (H) 44.00 - 72.00 %    Lymphocytes 13.20 (L) 22.00 - 41.00 %    Monocytes 5.90 0.00 - 13.40 %    Eosinophils 0.10 0.00 - 6.90 %    Basophils 0.10 0.00 - 1.80 %    Immature Granulocytes 0.50 0.00 - 0.90 %    Nucleated RBC 0.00 /100 WBC    Neutrophils (Absolute) 13.40 (H) 2.00 - 7.15 K/uL    Lymphs (Absolute) 2.21 1.00 - 4.80 K/uL    Monos (Absolute) 0.99 (H) 0.00 - 0.85 K/uL    Eos (Absolute) 0.02 0.00 - 0.51 K/uL    Baso (Absolute) 0.02 0.00 - 0.12 K/uL    Immature Granulocytes (abs) 0.08 0.00 - 0.11 K/uL    NRBC (Absolute) 0.00 K/uL   CBC without differential    Collection Time: 03/09/17 12:40 PM   Result Value Ref Range    WBC 20.7 (H) 4.8 - 10.8 K/uL    RBC 3.53 (L) 4.20 - 5.40 M/uL    Hemoglobin 10.1 (L) 12.0 - 16.0 g/dL    Hematocrit 31.9 (L) 37.0 - 47.0 %    MCV 90.4 81.4 - 97.8 fL    MCH 28.6 27.0 - 33.0 pg    MCHC 31.7 (L) 33.6 - 35.0 g/dL    RDW 46.7 35.9 - 50.0 fL    Platelet Count 167 164 - 446 K/uL    MPV 9.3 9.0 - 12.9 fL       PLAN:  Continue any current medications.  (See Med List for details.)  Return to clinic  : in 4 week(s)  Refill Percocet  #15 .

## 2017-03-29 ENCOUNTER — OFFICE VISIT (OUTPATIENT)
Dept: URGENT CARE | Facility: PHYSICIAN GROUP | Age: 22
End: 2017-03-29
Payer: OTHER GOVERNMENT

## 2017-03-29 VITALS
RESPIRATION RATE: 14 BRPM | HEIGHT: 63 IN | WEIGHT: 160 LBS | OXYGEN SATURATION: 100 % | SYSTOLIC BLOOD PRESSURE: 122 MMHG | DIASTOLIC BLOOD PRESSURE: 80 MMHG | BODY MASS INDEX: 28.35 KG/M2 | HEART RATE: 74 BPM | TEMPERATURE: 97.6 F

## 2017-03-29 DIAGNOSIS — L02.31 ABSCESS OF LEFT BUTTOCK: ICD-10-CM

## 2017-03-29 PROCEDURE — 99214 OFFICE O/P EST MOD 30 MIN: CPT | Performed by: FAMILY MEDICINE

## 2017-03-29 RX ORDER — SULFAMETHOXAZOLE AND TRIMETHOPRIM 800; 160 MG/1; MG/1
TABLET ORAL
Qty: 20 TAB | Refills: 0 | Status: SHIPPED | OUTPATIENT
Start: 2017-03-29 | End: 2018-01-16

## 2017-03-29 NOTE — PROGRESS NOTES
Chief Complaint:    Chief Complaint   Patient presents with   • Cyst     On L butt cheek getting worse       History of Present Illness:    Has abscess on left butt cheek that started 5 days ago. [facility] and had [facility]D done and was rx'd Augmentin. Feels she is getting worse and was told she needs to be seen again.      Review of Systems:    Constitutional: Negative for fever, chills, and diaphoresis.   Eyes: Negative for change in vision, photophobia, pain, redness, and discharge.  ENT: Negative for ear pain, ear discharge, hearing loss, tinnitus, nasal congestion, nosebleeds, and sore throat.    Respiratory: Negative for cough, hemoptysis, sputum production, shortness of breath, wheezing, and stridor.    Cardiovascular: Negative for chest pain, palpitations, orthopnea, claudication, leg swelling, and PND.   Gastrointestinal: Negative for abdominal pain, nausea, vomiting, diarrhea, constipation, blood in stool, and melena.   Genitourinary: Negative for dysuria, urinary urgency, urinary frequency, hematuria, and flank pain.   Musculoskeletal: Negative for myalgias, joint pain, neck pain, and back pain.   Skin: See HPI.   Neurological: Negative for dizziness, tingling, tremors, sensory change, speech change, focal weakness, seizures, loss of consciousness, and headaches.   Endo: Negative for polydipsia.   Heme: Does not bruise/bleed easily.   Psychiatric/Behavioral: Negative for depression, suicidal ideas, hallucinations, memory loss and substance abuse. The patient is not nervous/anxious and does not have insomnia.      Past Medical History:    Past Medical History   Diagnosis Date   • Fibromyalgia    • Arthritis    • Headache, classical migraine    • Migraines    • Supervision of other high risk pregnancy, antepartum 2/14/2017       Past Surgical History:    Past Surgical History   Procedure Laterality Date   • Other orthopedic surgery     • Primary c section  9/14/2014     Performed by Rebekah Rodriguez M.D. at  [facility]   • Repeat c section  3/9/2017     Procedure: REPEAT C SECTION;  Surgeon: Hemanth Cisneros M.D.;  Location: [facility];  Service:        Social History:    Social History     Social History   • Marital Status:      Spouse Name: N/A   • Number of Children: N/A   • Years of Education: N/A     Occupational History   • Not on file.     Social History Main Topics   • Smoking status: Former Smoker     Start date: 09/14/2011   • Smokeless tobacco: Never Used   • Alcohol Use: No   • Drug Use: Yes      Comment: lorena    • Sexual Activity: Not on file     Other Topics Concern   • Not on file     Social History Narrative       Family History:    Family History   Problem Relation Age of Onset   • Heart Disease Maternal Grandmother    • Heart Disease Paternal Grandmother    • Heart Disease Paternal Grandfather    • Hypertension Father    • Diabetes Maternal Grandmother    • Diabetes Father        Medications:    Current Outpatient Prescriptions on File Prior to Visit   Medication Sig Dispense Refill   • oxycodone-acetaminophen (PERCOCET) 5-325 MG Tab Take 1-2 Tabs by mouth every four hours as needed for Moderate Pain. 15 Tab 0   • ibuprofen (MOTRIN) 600 MG Tab Take 1 Tab by mouth every 6 hours as needed (For cramping after delivery; do not give if patient is receiving ketorolac (Toradol)). 30 Tab 1   • ferrous sulfate 325 (65 FE) MG tablet Take 1 Tab by mouth 3 times a day, with meals. 30 Tab 0   • docusate sodium 100 MG Cap Take 100 mg by mouth 2 times a day as needed for Constipation. 60 Cap 1   • Prenatal MV-Min-Fe Fum-FA-DHA (PRENATAL 1 PO) Take  by mouth.       No current facility-administered medications on file prior to visit.       Allergies:    Allergies   Allergen Reactions   • Ortho Tri-Cyclen [Norgestimate-Ethinyl Estradiol] Anaphylaxis     Ortho tri-cyclen lo         Vitals:    Filed Vitals:    03/29/17 0950   BP: 122/80   Pulse: 74   Temp: 36.4 °C (97.6 °F)   Resp: 14   Height:  "1.6 m (5' 3\")   Weight: 72.576 kg (160 lb)   SpO2: 100%       Physical Exam:    Constitutional: Vital signs reviewed. Appears well-developed and well-nourished. No acute distress.   Eyes: Sclera white, conjunctivae clear.  ENT: External ears normal. Hearing normal.  Cardiovascular: Peripheral pulses 2+.   Pulmonary/Chest: Respirations non-labored.  Musculoskeletal: Normal gait. Normal range of motion. No muscular atrophy or weakness.  Neurological: Alert and oriented to person, place, and time. Muscle tone normal. Coordination normal. Light touch and sensation normal.   Skin: Left butt cheek: I&D site with a pus plug in it with surrounding erythema and swelling of skin with tenderness to palpation.  Psychiatric: Normal mood and affect. Behavior is normal. Judgment and thought content normal.       Assessment / Plan:    1. Abscess of left buttock  - sulfamethoxazole-trimethoprim (BACTRIM DS) 800-160 MG tablet; 1 TAB TWICE A DAY X 10 DAYS.  Dispense: 20 Tab; Refill: 0      Discussed with her DDX and management options.    Was agreeable to me plucking out the pus plug.    I tried spraying ethyl chloride to area and picking out pus plug with tweezers, but it was too painful for her.    She was agreeable to local anesthetic injection with Lidocaine 2% which was done after cleansing skin with Betadine.    I was able to pick out the pus plug but also express more pus and more pus plugs out.    Agreeable to medication prescribed. She will also continue with Augmentin [facility]'d by [facility] on [facility]17.    She will return if getting worse or not improving in 48 hrs.  "

## 2017-05-01 ENCOUNTER — TELEPHONE (OUTPATIENT)
Dept: OBGYN | Facility: CLINIC | Age: 22
End: 2017-05-01

## 2017-05-01 NOTE — TELEPHONE ENCOUNTER
Patient states she has had three boils since giving birth. Buttock, vaginal, back of knee. The one on her buttock had to be drained Patient is not sure if she should see her primary. She had read that it can be part of post partum.    Please advise

## 2017-05-01 NOTE — TELEPHONE ENCOUNTER
Pt called stating she has 3 boils and was asking if she needed to come in sooner. Pt made appt for 5/9/17 for post partum appt and to check boils. Recommended pt to come to her appt and if she needs to be seen sooner she needs to go to Urgent care. Pt verbalized understanding and had no further questions.

## 2017-05-04 ENCOUNTER — OFFICE VISIT (OUTPATIENT)
Dept: URGENT CARE | Facility: PHYSICIAN GROUP | Age: 22
End: 2017-05-04
Payer: OTHER GOVERNMENT

## 2017-05-04 ENCOUNTER — HOSPITAL ENCOUNTER (OUTPATIENT)
Facility: MEDICAL CENTER | Age: 22
End: 2017-05-04
Attending: FAMILY MEDICINE
Payer: OTHER GOVERNMENT

## 2017-05-04 VITALS
WEIGHT: 155 LBS | BODY MASS INDEX: 27.46 KG/M2 | SYSTOLIC BLOOD PRESSURE: 118 MMHG | TEMPERATURE: 97.7 F | HEIGHT: 63 IN | HEART RATE: 77 BPM | OXYGEN SATURATION: 97 % | DIASTOLIC BLOOD PRESSURE: 80 MMHG

## 2017-05-04 DIAGNOSIS — L02.91 ABSCESS: ICD-10-CM

## 2017-05-04 PROCEDURE — 87205 SMEAR GRAM STAIN: CPT

## 2017-05-04 PROCEDURE — 87186 SC STD MICRODIL/AGAR DIL: CPT

## 2017-05-04 PROCEDURE — 87077 CULTURE AEROBIC IDENTIFY: CPT

## 2017-05-04 PROCEDURE — 10061 I&D ABSCESS COMP/MULTIPLE: CPT | Performed by: FAMILY MEDICINE

## 2017-05-04 PROCEDURE — 87070 CULTURE OTHR SPECIMN AEROBIC: CPT

## 2017-05-04 PROCEDURE — 87075 CULTR BACTERIA EXCEPT BLOOD: CPT

## 2017-05-04 RX ORDER — SULFAMETHOXAZOLE AND TRIMETHOPRIM 800; 160 MG/1; MG/1
1 TABLET ORAL 2 TIMES DAILY
Qty: 14 TAB | Refills: 0 | Status: SHIPPED | OUTPATIENT
Start: 2017-05-04 | End: 2017-05-11

## 2017-05-05 LAB
GRAM STN SPEC: NORMAL
SIGNIFICANT IND 70042: NORMAL
SITE SITE: NORMAL
SOURCE SOURCE: NORMAL

## 2017-05-05 NOTE — PROGRESS NOTES
"Subjective:      Chief Complaint   Patient presents with   • Nodule                     Nodule - back of left knee  This is a new problem. The current episode started in the past 3 days. The problem is unchanged.  The rash is characterized by redness, swelling and pain. Pt was exposed to nothing. Pertinent negatives include no cough, fatigue, fever, shortness of breath or sore throat. Past treatments include nothing.  she is currently breastfeeding.   She states that she has had several abscesses in the past year.     Past Medical History   Diagnosis Date   • Fibromyalgia    • Arthritis    • Headache, classical migraine    • Migraines    • Supervision of other high risk pregnancy, antepartum 2/14/2017         Social History   Substance Use Topics   • Smoking status: Former Smoker     Start date: 09/14/2011   • Smokeless tobacco: Never Used   • Alcohol Use: No             Review of Systems   Constitutional: Negative for fever and fatigue.   HENT: Negative for sore throat.    Respiratory: Negative for cough and shortness of breath.    Skin: Positive for nodule.   All other systems reviewed and are negative.         Objective:   Blood pressure 118/80, pulse 77, temperature 36.5 °C (97.7 °F), height 1.6 m (5' 3\"), weight 70.308 kg (155 lb), SpO2 97 %, currently breastfeeding.      Physical Exam   Constitutional: pt is oriented to person, place, and time. Pt appears well-developed and well-nourished. No distress.   HENT:   Head: Normocephalic and atraumatic.   Mouth/Throat: Oropharynx is clear and moist and mucous membranes are normal. No uvula swelling. No oropharyngeal exudate, posterior oropharyngeal edema or posterior oropharyngeal erythema.   Eyes: Conjunctivae are normal.   Cardiovascular: Normal rate, regular rhythm and normal heart sounds.    Pulmonary/Chest: Effort normal and breath sounds normal. No respiratory distress. She has no wheezes.   Neurological: pt is alert and oriented to person, place, and time. No " cranial nerve deficit.   Skin: 2cm erythematous, fluctuant nodule, left popliteal fossa. Pt is not diaphoretic.    Psychiatric: pt's behavior is normal.   Nursing note and vitals reviewed.              Assessment/Plan:           1. Abscess          After informed consent was obtained -Risks, benefits, and alternatives discussed,   including infection, bleeding, nerve damage, and poor cosmetic outcome    Area was prepped and draped in usual sterile fashion.  After performing local field block with 1% lidocaine, with epinephrine, abscess was incised and drained and packed with iodoform gauze.  Blunt dissection was used to break up loculations.  Cavity irrigated with copious amount of normal saline.  Scant amount of cheesy, purulent material was expressed, and cultures were taken and sent for gram staining.    Wound was cleaned and dressed and wound care instructions given.   RTC in 48 hrs for wound check, packing removal.      will start on bactrim, empirically      Referred to derm since this is a recurrent issue    - ANAEROBIC/AEROBIC/GRAM STAIN

## 2017-05-06 ENCOUNTER — OFFICE VISIT (OUTPATIENT)
Dept: URGENT CARE | Facility: PHYSICIAN GROUP | Age: 22
End: 2017-05-06
Payer: OTHER GOVERNMENT

## 2017-05-06 VITALS
RESPIRATION RATE: 16 BRPM | OXYGEN SATURATION: 97 % | DIASTOLIC BLOOD PRESSURE: 70 MMHG | SYSTOLIC BLOOD PRESSURE: 118 MMHG | HEIGHT: 63 IN | BODY MASS INDEX: 27.46 KG/M2 | WEIGHT: 155 LBS | HEART RATE: 88 BPM | TEMPERATURE: 97.4 F

## 2017-05-06 DIAGNOSIS — Z51.89 WOUND CHECK, ABSCESS: ICD-10-CM

## 2017-05-06 PROCEDURE — 99024 POSTOP FOLLOW-UP VISIT: CPT | Performed by: PHYSICIAN ASSISTANT

## 2017-05-06 RX ORDER — OXYCODONE HYDROCHLORIDE AND ACETAMINOPHEN 5; 325 MG/1; MG/1
1 TABLET ORAL EVERY 6 HOURS PRN
Qty: 12 TAB | Refills: 0 | Status: SHIPPED | OUTPATIENT
Start: 2017-05-06 | End: 2018-01-16

## 2017-05-06 NOTE — MR AVS SNAPSHOT
"        Rowan Leos   2017 11:30 AM   Office Visit   MRN: [id number]    Department:  [facility]   Dept Phone:  [phone]    Description:  Female : 1995   Provider:  Jeremy De La Torre PA-C           Reason for Visit     Wound Check nodule back of L knee       Allergies as of 2017     Allergen Noted Reactions    Ortho Tri-Cyclen [Norgestimate-Ethinyl Estradiol] 2010   Anaphylaxis    Ortho tri-cyclen lo      You were diagnosed with     Wound check, abscess   [[id number]]         Vital Signs     Blood Pressure Pulse Temperature Respirations Height Weight    118/70 mmHg 88 36.3 °C (97.4 °F) 16 1.6 m (5' 3\") 70.308 kg (155 lb)    Body Mass Index Oxygen Saturation Smoking Status             27.46 kg/m2 97% Former Smoker         Basic Information     Date Of Birth Sex Race Ethnicity Preferred Language    1995 Female White Non- English      Your appointments     May 09, 2017 10:15 AM   Post Partum / Surgery with Chemo Ndiaye M.D.   [facility] ([address], [address]  [facility] [address]   [phone]              Problem List              ICD-10-CM Priority Class Noted - Resolved    Fibromyalgia M79.7   9/3/2014 - Present    Arthritis M19.90   9/3/2014 - Present    Hx of migraine headaches Z86.69   9/3/2014 - Present    Previous  delivery, antepartum O34.219   10/17/2016 - Present    Supervision of other high risk pregnancy, antepartum O09.899   2017 - Present    Labor and delivery, indication for care O75.9   3/8/2017 - Present      Health Maintenance        Date Due Completion Dates    IMM HEP B VACCINE (1 of 3 - Primary Series) 1993 ---    IMM HEP A VACCINE (1 of 2 - Standard Series) 1994 ---    IMM HPV VACCINE (1 of 3 - Female 3 Dose Series) 2004 ---    IMM VARICELLA (CHICKENPOX) VACCINE (1 of 2 - 2 Dose Adolescent Series) 2006 ---    PAP SMEAR 3/25/2019 3/25/2016, 3/25/2016 (Done), 2014   " Override on 3/25/2016: Done    IMM DTaP/Tdap/Td Vaccine (3 - Td) 12/13/2026 12/13/2016, 9/14/2014            Current Immunizations     Influenza Vaccine Quad Inj (Pf) 12/2/2016    MMR/Varicella Combined Vaccine  Incomplete    Tdap Vaccine 12/13/2016,  Incomplete, 9/14/2014 11:30 PM      Below and/or attached are the medications your provider expects you to take. Review all of your home medications and newly ordered medications with your provider and/or pharmacist. Follow medication instructions as directed by your provider and/or pharmacist. Please keep your medication list with you and share with your provider. Update the information when medications are discontinued, doses are changed, or new medications (including over-the-counter products) are added; and carry medication information at all times in the event of emergency situations     Allergies:  ORTHO TRI-CYCLEN - Anaphylaxis               Medications  Valid as of: May 06, 2017 - 12:10 PM    Generic Name Brand Name Tablet Size Instructions for use    Docusate Sodium (Cap)  MG Take 100 mg by mouth 2 times a day as needed for Constipation.        Ferrous Sulfate (Tab) ferrous sulfate 325 (65 FE) MG Take 1 Tab by mouth 3 times a day, with meals.        Ibuprofen (Tab) MOTRIN 600 MG Take 1 Tab by mouth every 6 hours as needed (For cramping after delivery; do not give if patient is receiving ketorolac (Toradol)).        Oxycodone-Acetaminophen (Tab) PERCOCET 5-325 MG Take 1-2 Tabs by mouth every four hours as needed for Moderate Pain.        Oxycodone-Acetaminophen (Tab) PERCOCET 5-325 MG Take 1 Tab by mouth every 6 hours as needed.        Prenatal MV-Min-Fe Fum-FA-DHA   Take  by mouth.        Sulfamethoxazole-Trimethoprim (Tab) BACTRIM -160 MG 1 TAB TWICE A DAY X 10 DAYS.        Sulfamethoxazole-Trimethoprim (Tab) BACTRIM -160 MG Take 1 Tab by mouth 2 times a day for 7 days.        .                 Medicines prescribed today were sent to:      [id number]  [place], [place] - [address] AT [facility] [address]    [address] [place] [address]    Phone: [phone] Fax: [phone]    Open 24 Hours?: No      Medication refill instructions:       If your prescription bottle indicates you have medication refills left, it is not necessary to call your provider’s office. Please contact your pharmacy and they will refill your medication.    If your prescription bottle indicates you do not have any refills left, you may request refills at any time through one of the following ways: The online Picklive system (except Urgent Care), by calling your provider’s office, or by asking your pharmacy to contact your provider’s office with a refill request. Medication refills are processed only during regular business hours and may not be available until the next business day. Your provider may request additional information or to have a follow-up visit with you prior to refilling your medication.   *Please Note: Medication refills are assigned a new Rx number when refilled electronically. Your pharmacy may indicate that no refills were authorized even though a new prescription for the same medication is available at the pharmacy. Please request the medicine by name with the pharmacy before contacting your provider for a refill.        Other Notes About Your Plan     Sees Acupuncturist for Fibromyalgia   Review of Op note: 5cm , late decelerations , Low transverse , single layer only   Desires TOLAC           MyChart Access Code: Activation code not generated  Current MyChart Status: Active

## 2017-05-06 NOTE — PROGRESS NOTES
Wound Check:     abscess/nodule incision and drained 2 days ago with culture taken, culture still pending. Patient reports significant pain still, she was offered pain medication at time of incision and drainage but denied. She is wishing she would have accepted pain medication at this time.    Bandage taken off of wound and packing removed, minimal purulent drainage, mild erythema in 1 cm diameter around wound, less than 1 cm surgical opening with no raised inflammation today. Moderate tenderness over surgical meatus, wound irrigated with no additional purulent material expressed in minimal hemorrhage.  Wound repacked and new dressing placed.    Patient asked to return to clinic in 2 days for additional wound evaluation, possible repacking and recheck of culture results.  Prescription for 5 mg Percocet No. 12 given today, asked her to continue with Advil when necessary.  Patient reports understanding and agrees with plan.

## 2017-05-06 NOTE — PROGRESS NOTES
"Subjective:      Rowan Leos is a 21 y.o. female who presents with Wound Check    Current medications reviewed.  Past Medical History   Diagnosis Date   • Fibromyalgia    • Arthritis    • Headache, classical migraine    • Migraines    • Supervision of other high risk pregnancy, antepartum 2/14/2017     Social History   Substance Use Topics   • Smoking status: Former Smoker     Start date: 09/14/2011   • Smokeless tobacco: Never Used   • Alcohol Use: No     Family History Reviewed: noncontributory          Wound Check        ROS       Objective:     /70 mmHg  Pulse 88  Temp(Src) 36.3 °C (97.4 °F)  Resp 16  Ht 1.6 m (5' 3\")  Wt 70.308 kg (155 lb)  BMI 27.46 kg/m2  SpO2 97%     Physical Exam            Assessment/Plan:     No diagnosis found.        "

## 2017-05-07 LAB
BACTERIA WND AEROBE CULT: ABNORMAL
GRAM STN SPEC: ABNORMAL
SIGNIFICANT IND 70042: ABNORMAL
SITE SITE: ABNORMAL
SOURCE SOURCE: ABNORMAL

## 2017-05-08 ENCOUNTER — OFFICE VISIT (OUTPATIENT)
Dept: URGENT CARE | Facility: PHYSICIAN GROUP | Age: 22
End: 2017-05-08
Payer: OTHER GOVERNMENT

## 2017-05-08 VITALS
WEIGHT: 155 LBS | HEART RATE: 69 BPM | BODY MASS INDEX: 27.46 KG/M2 | OXYGEN SATURATION: 99 % | DIASTOLIC BLOOD PRESSURE: 76 MMHG | TEMPERATURE: 97.8 F | HEIGHT: 63 IN | SYSTOLIC BLOOD PRESSURE: 114 MMHG

## 2017-05-08 DIAGNOSIS — Z51.89 VISIT FOR WOUND CHECK: ICD-10-CM

## 2017-05-08 LAB
BACTERIA SPEC ANAEROBE CULT: NORMAL
SIGNIFICANT IND 70042: NORMAL
SITE SITE: NORMAL
SOURCE SOURCE: NORMAL

## 2017-05-08 PROCEDURE — 99024 POSTOP FOLLOW-UP VISIT: CPT | Performed by: NURSE PRACTITIONER

## 2017-05-08 NOTE — PROGRESS NOTES
Ms. Leos is here for recheck on wound in right popliteal area. She had I&D on  with packing. Wound culture grew out S. Aureus and she is on bactrim. Wound packing out with dressing. Wound is virtually closed. Redressed. I have reviewed culture results and she is concerned as she has had several since her baby was born (). Recommended showering in antibacterial soap or hibiclens x 7-10 days. She is to follow up for any additional concerns.

## 2017-05-09 ENCOUNTER — POST PARTUM (OUTPATIENT)
Dept: OBGYN | Facility: CLINIC | Age: 22
End: 2017-05-09
Payer: COMMERCIAL

## 2017-05-09 VITALS
HEIGHT: 63 IN | SYSTOLIC BLOOD PRESSURE: 112 MMHG | BODY MASS INDEX: 27.46 KG/M2 | DIASTOLIC BLOOD PRESSURE: 72 MMHG | WEIGHT: 155 LBS

## 2017-05-09 PROCEDURE — 90050 PR POSTPARTUM VISIT: CPT | Performed by: OBSTETRICS & GYNECOLOGY

## 2017-05-09 NOTE — MR AVS SNAPSHOT
"        Rowan Leos   2017 10:15 AM   Post Partum   MRN: [id number]    Department:  [facility]   Dept Phone:  [phone]    Description:  Female : 1995   Provider:  Chemo Ndiaye M.D.           Reason for Visit     Postpartum care           Allergies as of 2017     Allergen Noted Reactions    Ortho Tri-Cyclen [Norgestimate-Ethinyl Estradiol] 2010   Anaphylaxis    Ortho tri-cyclen lo      You were diagnosed with     Postpartum care and examination of lactating mother   [V24.1.ICD-9-CM]         Vital Signs     Blood Pressure Height Weight Body Mass Index Breastfeeding? Smoking Status    112/72 mmHg 1.6 m (5' 2.99\") 70.308 kg (155 lb) 27.46 kg/m2 No Former Smoker      Basic Information     Date Of Birth Sex Race Ethnicity Preferred Language    1995 Female White Non- English      Problem List              ICD-10-CM Priority Class Noted - Resolved    Fibromyalgia M79.7   9/3/2014 - Present    Arthritis M19.90   9/3/2014 - Present    Hx of migraine headaches Z86.69   9/3/2014 - Present    Previous  delivery, antepartum O34.219   10/17/2016 - Present    Supervision of other high risk pregnancy, antepartum O09.899   2017 - Present    Labor and delivery, indication for care O75.9   3/8/2017 - Present      Health Maintenance        Date Due Completion Dates    IMM HEP B VACCINE (1 of 3 - Primary Series) 1993 ---    IMM HEP A VACCINE (1 of 2 - Standard Series) 1994 ---    IMM HPV VACCINE (1 of 3 - Female 3 Dose Series) 2004 ---    IMM VARICELLA (CHICKENPOX) VACCINE (1 of 2 - 2 Dose Adolescent Series) 2006 ---    PAP SMEAR 3/25/2019 3/25/2016, 3/25/2016 (Done), 2014    Override on 3/25/2016: Done    IMM DTaP/Tdap/Td Vaccine (3 - Td) 2026, 2014            Current Immunizations     Influenza Vaccine Quad Inj (Pf) 2016    MMR/Varicella Combined Vaccine  Incomplete    Tdap Vaccine 2016,  Incomplete, 2014 " 11:30 PM      Below and/or attached are the medications your provider expects you to take. Review all of your home medications and newly ordered medications with your provider and/or pharmacist. Follow medication instructions as directed by your provider and/or pharmacist. Please keep your medication list with you and share with your provider. Update the information when medications are discontinued, doses are changed, or new medications (including over-the-counter products) are added; and carry medication information at all times in the event of emergency situations     Allergies:  ORTHO TRI-CYCLEN - Anaphylaxis               Medications  Valid as of: May 09, 2017 - 10:56 AM    Generic Name Brand Name Tablet Size Instructions for use    Docusate Sodium (Cap)  MG Take 100 mg by mouth 2 times a day as needed for Constipation.        Ferrous Sulfate (Tab) ferrous sulfate 325 (65 FE) MG Take 1 Tab by mouth 3 times a day, with meals.        Ibuprofen (Tab) MOTRIN 600 MG Take 1 Tab by mouth every 6 hours as needed (For cramping after delivery; do not give if patient is receiving ketorolac (Toradol)).        Oxycodone-Acetaminophen (Tab) PERCOCET 5-325 MG Take 1-2 Tabs by mouth every four hours as needed for Moderate Pain.        Oxycodone-Acetaminophen (Tab) PERCOCET 5-325 MG Take 1 Tab by mouth every 6 hours as needed.        Prenatal MV-Min-Fe Fum-FA-DHA   Take  by mouth.        Sulfamethoxazole-Trimethoprim (Tab) BACTRIM -160 MG 1 TAB TWICE A DAY X 10 DAYS.        Sulfamethoxazole-Trimethoprim (Tab) BACTRIM -160 MG Take 1 Tab by mouth 2 times a day for 7 days.        .                 Medicines prescribed today were sent to:     [id number] - [place] - [address] AT [facility] OF [address]    [address] [place] [address]    Phone: [phone] Fax: [phone]    Open 24 Hours?: No      Medication refill instructions:       If your prescription bottle  indicates you have medication refills left, it is not necessary to call your provider’s office. Please contact your pharmacy and they will refill your medication.    If your prescription bottle indicates you do not have any refills left, you may request refills at any time through one of the following ways: The online SnoopWall system (except Urgent Care), by calling your provider’s office, or by asking your pharmacy to contact your provider’s office with a refill request. Medication refills are processed only during regular business hours and may not be available until the next business day. Your provider may request additional information or to have a follow-up visit with you prior to refilling your medication.   *Please Note: Medication refills are assigned a new Rx number when refilled electronically. Your pharmacy may indicate that no refills were authorized even though a new prescription for the same medication is available at the pharmacy. Please request the medicine by name with the pharmacy before contacting your provider for a refill.        Other Notes About Your Plan     Sees Acupuncturist for Fibromyalgia   Review of Op note: 5cm , late decelerations , Low transverse , single layer only   Desires TOLAC           [employer] Electrichart Access Code: Activation code not generated  Current SnoopWall Status: Active

## 2017-05-09 NOTE — PROGRESS NOTES
21 y.o.   female  S/P  repeat  section, low transverse incision. Date of Delivery: 3/9/2017                                   Presents for PostPartum Exam.    Subjective:No pain, bleeding, unusual discharge or leeking and No substance abuse, STD risk, JACQUE exposure no discharge without any bleeding  breastNo new breast lumps or masses, No severe breast pain, No nipple discharge, No recent change in shape/color  without any bleeding    Past Medical History   Diagnosis Date   • Fibromyalgia    • Arthritis    • Headache, classical migraine    • Migraines    • Supervision of other high risk pregnancy, antepartum 2017     Past Surgical History   Procedure Laterality Date   • Other orthopedic surgery     • Primary c section  2014     Performed by Rebekah Rodriguez M.D. at [facility]   • Repeat c section  3/9/2017     Procedure: REPEAT C SECTION;  Surgeon: Hemanth Cisneros M.D.;  Location: [facility];  Service:      Current Outpatient Prescriptions on File Prior to Visit   Medication Sig Dispense Refill   • oxycodone-acetaminophen (PERCOCET) 5-325 MG Tab Take 1 Tab by mouth every 6 hours as needed. 12 Tab 0   • sulfamethoxazole-trimethoprim (BACTRIM DS) 800-160 MG tablet Take 1 Tab by mouth 2 times a day for 7 days. 14 Tab 0   • sulfamethoxazole-trimethoprim (BACTRIM DS) 800-160 MG tablet 1 TAB TWICE A DAY X 10 DAYS. 20 Tab 0   • oxycodone-acetaminophen (PERCOCET) 5-325 MG Tab Take 1-2 Tabs by mouth every four hours as needed for Moderate Pain. 15 Tab 0   • ibuprofen (MOTRIN) 600 MG Tab Take 1 Tab by mouth every 6 hours as needed (For cramping after delivery; do not give if patient is receiving ketorolac (Toradol)). 30 Tab 1   • ferrous sulfate 325 (65 FE) MG tablet Take 1 Tab by mouth 3 times a day, with meals. 30 Tab 0   • docusate sodium 100 MG Cap Take 100 mg by mouth 2 times a day as needed for Constipation. 60 Cap 1   • Prenatal MV-Min-Fe Fum-FA-DHA (PRENATAL 1 PO) Take  by mouth.    "    No current facility-administered medications on file prior to visit.     Ortho tri-cyclen      Objective:  /72 mmHg  Ht 1.6 m (5' 2.99\")  Wt 70.308 kg (155 lb)  BMI 27.46 kg/m2  Breastfeeding? No    Breast:No breast masses noted  Abdomen:Soft, nontender, no hernias, masses, or organomegaly, Pfannenstiel incision dry and intact, healing well with good reapproximation, no evidence of infection, separation or keloid formation.  Pelvic:Bladder normal w/o fullness, mass or tenderness, Uterus normal size w/o mass or tenderness, Adnexa w/o enlargement, mass or tenderness    Birth control:none    Lab:  Recent Results (from the past 1008 hour(s))   CULTURE WOUND W/ GRAM STAIN    Collection Time: 05/04/17  5:20 PM   Result Value Ref Range    Significant Indicator POS     Source WND     Site LEFT POPLITEAL FOSSA     Gram Stain Result Moderate WBCs.  Few Gram positive cocci.       Culture Result Wound Staphylococcus aureus  Heavy growth   (A)        Susceptibility    Staphylococcus aureus -  (no method available)     Clindamycin <=0.5 Sensitive mcg/mL     Daptomycin <=0.5 Sensitive mcg/mL     Erythromycin <=0.5 Sensitive mcg/mL     Moxifloxacin 2 Sensitive mcg/mL     Ampicillin/sulbactam <=8/4 Sensitive mcg/mL     Oxacillin 0.5 Sensitive mcg/mL     Vancomycin 2 Sensitive mcg/mL     Penicillin 8 Resistant mcg/mL     Trimeth/Sulfa <=0.5/9.5 Sensitive mcg/mL     Tetracycline <=4 Sensitive mcg/mL   ANAEROBIC CULTURE    Collection Time: 05/04/17  5:20 PM   Result Value Ref Range    Significant Indicator NEG     Source WND     Site LEFT POPLITEAL FOSSA     Anaerobic Culture, Culture Res No Anaerobes isolated.    GRAM STAIN    Collection Time: 05/04/17  5:20 PM   Result Value Ref Range    Significant Indicator NEG     Source WND     Site LEFT POPLITEAL FOSSA     Gram Stain Result Moderate WBCs.  Few Gram positive cocci.          Assessment:  normal postpartum course  plans to breastfeed  6 months  Condoms PRN SO out of " country

## 2017-07-18 ENCOUNTER — OFFICE VISIT (OUTPATIENT)
Dept: URGENT CARE | Facility: PHYSICIAN GROUP | Age: 22
End: 2017-07-18
Payer: OTHER GOVERNMENT

## 2017-07-18 VITALS
BODY MASS INDEX: 27.46 KG/M2 | HEART RATE: 74 BPM | DIASTOLIC BLOOD PRESSURE: 72 MMHG | OXYGEN SATURATION: 96 % | TEMPERATURE: 98.2 F | SYSTOLIC BLOOD PRESSURE: 120 MMHG | WEIGHT: 155 LBS

## 2017-07-18 DIAGNOSIS — L02.91 ABSCESS: ICD-10-CM

## 2017-07-18 PROCEDURE — 99214 OFFICE O/P EST MOD 30 MIN: CPT | Performed by: FAMILY MEDICINE

## 2017-07-18 RX ORDER — CLINDAMYCIN HYDROCHLORIDE 300 MG/1
300 CAPSULE ORAL 3 TIMES DAILY
Qty: 21 CAP | Refills: 0 | Status: SHIPPED | OUTPATIENT
Start: 2017-07-18 | End: 2017-07-25

## 2017-07-18 NOTE — MR AVS SNAPSHOT
Rowan KATRIN Deric   2017 5:25 PM   Office Visit   MRN: [id number]    Department:  [facility]   Dept Phone:  [phone]    Description:  Female : 1995   Provider:  Jey Rowley M.D.           Reason for Visit     Abscess meds not working has new abscess on butt x1day      Allergies as of 2017     Allergen Noted Reactions    Ortho Tri-Cyclen [Norgestimate-Ethinyl Estradiol] 2010   Anaphylaxis    Ortho tri-cyclen lo      You were diagnosed with     Abscess   [[id number]]         Vital Signs     Blood Pressure Pulse Temperature Weight Oxygen Saturation Smoking Status    120/72 mmHg 74 36.8 °C (98.2 °F) 70.308 kg (155 lb) 96% Former Smoker      Basic Information     Date Of Birth Sex Race Ethnicity Preferred Language    1995 Female White Non- English      Problem List              ICD-10-CM Priority Class Noted - Resolved    Fibromyalgia M79.7   9/3/2014 - Present    Arthritis M19.90   9/3/2014 - Present    Hx of migraine headaches Z86.69   9/3/2014 - Present    Previous  delivery, antepartum O34.219   10/17/2016 - Present    Supervision of other high risk pregnancy, antepartum O09.899   2017 - Present    Labor and delivery, indication for care O75.9   3/8/2017 - Present      Health Maintenance        Date Due Completion Dates    IMM HEP B VACCINE (1 of 3 - Primary Series) 1993 ---    IMM HEP A VACCINE (1 of 2 - Standard Series) 1994 ---    IMM HPV VACCINE (1 of 3 - Female 3 Dose Series) 2004 ---    IMM VARICELLA (CHICKENPOX) VACCINE (1 of 2 - 2 Dose Adolescent Series) 2006 ---    IMM INFLUENZA (1) 2017    PAP SMEAR 3/25/2019 3/25/2016, 3/25/2016 (Done), 2014    Override on 3/25/2016: Done    IMM DTaP/Tdap/Td Vaccine (3 - Td) 2026, 2014            Current Immunizations     Influenza Vaccine Quad Inj (Pf) 2016    MMR/Varicella Combined Vaccine  Incomplete    Tdap Vaccine 2016,  Incomplete,  9/14/2014 11:30 PM      Below and/or attached are the medications your provider expects you to take. Review all of your home medications and newly ordered medications with your provider and/or pharmacist. Follow medication instructions as directed by your provider and/or pharmacist. Please keep your medication list with you and share with your provider. Update the information when medications are discontinued, doses are changed, or new medications (including over-the-counter products) are added; and carry medication information at all times in the event of emergency situations     Allergies:  ORTHO TRI-CYCLEN - Anaphylaxis               Medications  Valid as of: July 18, 2017 -  6:24 PM    Generic Name Brand Name Tablet Size Instructions for use    Clindamycin HCl (Cap) CLEOCIN 300 MG Take 1 Cap by mouth 3 times a day for 7 days.        Docusate Sodium (Cap)  MG Take 100 mg by mouth 2 times a day as needed for Constipation.        Ferrous Sulfate (Tab) ferrous sulfate 325 (65 FE) MG Take 1 Tab by mouth 3 times a day, with meals.        Ibuprofen (Tab) MOTRIN 600 MG Take 1 Tab by mouth every 6 hours as needed (For cramping after delivery; do not give if patient is receiving ketorolac (Toradol)).        Oxycodone-Acetaminophen (Tab) PERCOCET 5-325 MG Take 1-2 Tabs by mouth every four hours as needed for Moderate Pain.        Oxycodone-Acetaminophen (Tab) PERCOCET 5-325 MG Take 1 Tab by mouth every 6 hours as needed.        Prenatal MV-Min-Fe Fum-FA-DHA   Take  by mouth.        Sulfamethoxazole-Trimethoprim (Tab) BACTRIM -160 MG 1 TAB TWICE A DAY X 10 DAYS.        .                 Medicines prescribed today were sent to:     [id number] - [place] - [address] AT [facility] [address]    [address] [address]    Phone: [phone] Fax: [phone]    Open 24 Hours?: No      Medication refill instructions:       If your prescription bottle indicates you  have medication refills left, it is not necessary to call your provider’s office. Please contact your pharmacy and they will refill your medication.    If your prescription bottle indicates you do not have any refills left, you may request refills at any time through one of the following ways: The online [employer] system (except Urgent Care), by calling your provider’s office, or by asking your pharmacy to contact your provider’s office with a refill request. Medication refills are processed only during regular business hours and may not be available until the next business day. Your provider may request additional information or to have a follow-up visit with you prior to refilling your medication.   *Please Note: Medication refills are assigned a new Rx number when refilled electronically. Your pharmacy may indicate that no refills were authorized even though a new prescription for the same medication is available at the pharmacy. Please request the medicine by name with the pharmacy before contacting your provider for a refill.        Instructions    Abscess  An abscess is an infected area that contains a collection of pus and debris. It can occur in almost any part of the body. An abscess is also known as a furuncle or boil.  CAUSES   An abscess occurs when tissue gets infected. This can occur from blockage of oil or sweat glands, infection of hair follicles, or a minor injury to the skin. As the body tries to fight the infection, pus collects in the area and creates pressure under the skin. This pressure causes pain. People with weakened immune systems have difficulty fighting infections and get certain abscesses more often.   SYMPTOMS  Usually an abscess develops on the skin and becomes a painful mass that is red, warm, and tender. If the abscess forms under the skin, you may feel a moveable soft area under the skin. Some abscesses break open (rupture) on their own, but most will continue to get worse without  care. The infection can spread deeper into the body and eventually into the bloodstream, causing you to feel ill.   DIAGNOSIS   Your caregiver will take your medical history and perform a physical exam. A sample of fluid may also be taken from the abscess to determine what is causing your infection.  TREATMENT   Your caregiver may prescribe antibiotic medicines to fight the infection. However, taking antibiotics alone usually does not cure an abscess. Your caregiver may need to make a small cut (incision) in the abscess to drain the pus. In some cases, gauze is packed into the abscess to reduce pain and to continue draining the area.  HOME CARE INSTRUCTIONS   · Only take over-the-counter or prescription medicines for pain, discomfort, or fever as directed by your caregiver.  · If you were prescribed antibiotics, take them as directed. Finish them even if you start to feel better.  · If gauze is used, follow your caregiver's directions for changing the gauze.  · To avoid spreading the infection:  ¨ Keep your draining abscess covered with a bandage.  ¨ Wash your hands well.  ¨ Do not share personal care items, towels, or whirlpools with others.  ¨ Avoid skin contact with others.  · Keep your skin and clothes clean around the abscess.  · Keep all follow-up appointments as directed by your caregiver.  SEEK MEDICAL CARE IF:   · You have increased pain, swelling, redness, fluid drainage, or bleeding.  · You have muscle aches, chills, or a general ill feeling.  · You have a fever.  MAKE SURE YOU:   · Understand these instructions.  · Will watch your condition.  · Will get help right away if you are not doing well or get worse.     This information is not intended to replace advice given to you by your health care provider. Make sure you discuss any questions you have with your health care provider.     Document Released: 09/27/2006 Document Revised: 06/18/2013 Document Reviewed: 03/01/2013  [employer] Interactive Patient  Education ©2016 [employer]         Other Notes About Your Plan     Sees Acupuncturist for Fibromyalgia   Review of Op note: 5cm , late decelerations , Low transverse , single layer only   Desires TOLAC           MyChart Access Code: Activation code not generated  Current MyChart Status: Active

## 2017-08-03 ASSESSMENT — ENCOUNTER SYMPTOMS
FEVER: 0
CHILLS: 0
NAUSEA: 0
VOMITING: 0

## 2017-08-03 NOTE — PROGRESS NOTES
Subjective:      Rowan Leos is a 22 y.o. female who presents with Abscess            Abscess  This is a new problem. The current episode started yesterday. The problem occurs constantly. The problem has been gradually worsening. Pertinent negatives include no chills, fever, nausea or vomiting.       Review of Systems   Constitutional: Negative for fever and chills.   Gastrointestinal: Negative for nausea and vomiting.     Allergies   Allergen Reactions   • Ortho Tri-Cyclen [Norgestimate-Ethinyl Estradiol] Anaphylaxis     Ortho tri-cyclen lo         Objective:     /72 mmHg  Pulse 74  Temp(Src) 36.8 °C (98.2 °F)  Wt 70.308 kg (155 lb)  SpO2 96%     Physical Exam   Constitutional: She is oriented to person, place, and time. She appears well-developed and well-nourished. No distress.   HENT:   Head: Normocephalic and atraumatic.   Eyes: Conjunctivae and EOM are normal. Pupils are equal, round, and reactive to light.   Cardiovascular: Normal rate and regular rhythm.    No murmur heard.  Pulmonary/Chest: Effort normal and breath sounds normal. No respiratory distress.   Abdominal: Soft. She exhibits no distension. There is no tenderness.   Neurological: She is alert and oriented to person, place, and time. She has normal reflexes. No sensory deficit.   Skin: Skin is warm and dry.   Well circumscribed erythema and induration noted on right gluteal area without definite fluctuance or purulence.   Psychiatric: She has a normal mood and affect.               Assessment/Plan:     1. Abscess  Differential diagnosis, natural history, supportive care, and indications for immediate follow-up discussed.   - clindamycin (CLEOCIN) 300 MG Cap; Take 1 Cap by mouth 3 times a day for 7 days.  Dispense: 21 Cap; Refill: 0  - mupirocin (BACTROBAN) 2 % Ointment; Apply 1 Film to affected area(s) every day. Apply to inside of nose  Dispense: 1 Tube; Refill: 0

## 2017-12-09 ENCOUNTER — HOSPITAL ENCOUNTER (EMERGENCY)
Facility: MEDICAL CENTER | Age: 22
End: 2017-12-09
Attending: EMERGENCY MEDICINE
Payer: COMMERCIAL

## 2017-12-09 ENCOUNTER — APPOINTMENT (OUTPATIENT)
Dept: RADIOLOGY | Facility: MEDICAL CENTER | Age: 22
End: 2017-12-09
Attending: EMERGENCY MEDICINE
Payer: COMMERCIAL

## 2017-12-09 VITALS
HEART RATE: 86 BPM | TEMPERATURE: 98.4 F | RESPIRATION RATE: 14 BRPM | HEIGHT: 63 IN | DIASTOLIC BLOOD PRESSURE: 76 MMHG | OXYGEN SATURATION: 97 % | BODY MASS INDEX: 28.35 KG/M2 | WEIGHT: 160 LBS | SYSTOLIC BLOOD PRESSURE: 121 MMHG

## 2017-12-09 DIAGNOSIS — N83.209 RUPTURED OVARIAN CYST: ICD-10-CM

## 2017-12-09 LAB
APPEARANCE UR: CLEAR
BILIRUB UR QL STRIP.AUTO: NEGATIVE
COLOR UR: YELLOW
CULTURE IF INDICATED INDCX: NO UA CULTURE
GLUCOSE UR STRIP.AUTO-MCNC: NEGATIVE MG/DL
HCG UR QL: NEGATIVE
KETONES UR STRIP.AUTO-MCNC: NEGATIVE MG/DL
LEUKOCYTE ESTERASE UR QL STRIP.AUTO: NEGATIVE
MICRO URNS: NORMAL
NITRITE UR QL STRIP.AUTO: NEGATIVE
PH UR STRIP.AUTO: 6.5 [PH]
PROT UR QL STRIP: NEGATIVE MG/DL
RBC UR QL AUTO: NEGATIVE
SP GR UR REFRACTOMETRY: 1.01
SP GR UR STRIP.AUTO: 1.01
UROBILINOGEN UR STRIP.AUTO-MCNC: 0.2 MG/DL

## 2017-12-09 PROCEDURE — 76830 TRANSVAGINAL US NON-OB: CPT

## 2017-12-09 PROCEDURE — 81025 URINE PREGNANCY TEST: CPT

## 2017-12-09 PROCEDURE — 81003 URINALYSIS AUTO W/O SCOPE: CPT

## 2017-12-09 PROCEDURE — 99284 EMERGENCY DEPT VISIT MOD MDM: CPT

## 2017-12-09 ASSESSMENT — PAIN SCALES - GENERAL: PAINLEVEL_OUTOF10: 4

## 2017-12-10 NOTE — ED PROVIDER NOTES
ED Provider Note    CHIEF COMPLAINT  Chief Complaint   Patient presents with   • Abdominal Pain       HPI  Rowan Leos is a 22 y.o. female [facility]. The patient has been experiencing right-sided pelvic and lower abdominal pain for the past 8 hours, she describes a gradual increase in severity but constantly present since onset, it seems to radiate towards her back and up towards the right side of her abdomen, she's been nauseated minimally but no vomiting, no changes in bowel movements, no dysuria or hematuria, no vaginal bleeding or vaginal discharge. [facility] which [facility] a [facility] WBC, she had a urinalysis that reportedly did not indicate infection. [facility] not [facility] right now so she was transferred here for a pelvic ultrasound. She states that she has a history of fibromyalgia as well as 2 C-sections in the past.    REVIEW OF SYSTEMS  Negative for fever, rash, chest pain, dyspnea, vomiting, diarrhea, headache, focal weakness, focal numbness, focal tingling. All other systems are negative.     PAST MEDICAL HISTORY  Past Medical History:   Diagnosis Date   • Arthritis    • Fibromyalgia    • Headache, classical migraine    • Migraines    • Supervision of other high risk pregnancy, antepartum 2/14/2017       FAMILY HISTORY  Family History   Problem Relation Age of Onset   • Heart Disease Maternal Grandmother    • Heart Disease Paternal Grandmother    • Heart Disease Paternal Grandfather    • Hypertension Father    • Diabetes Maternal Grandmother    • Diabetes Father        SOCIAL HISTORY  Social History   Substance Use Topics   • Smoking status: Former Smoker     Start date: 9/14/2011   • Smokeless tobacco: Never Used   • Alcohol use No       SURGICAL HISTORY  Past Surgical History:   Procedure Laterality Date   • REPEAT C SECTION  3/9/2017    Procedure: REPEAT C SECTION;  Surgeon: Hemanth Cisneros  "M.D.;  Location: [facility];  Service:    • PRIMARY C SECTION  9/14/2014    Performed by Rebekah Rodriguez M.D. at [facility] DEL[facility]   • OTHER ORTHOPEDIC SURGERY         CURRENT MEDICATIONS  I personally reviewed the medication list in the charting documentation.     ALLERGIES  Allergies   Allergen Reactions   • Ortho Tri-Cyclen [Norgestimate-Ethinyl Estradiol] Anaphylaxis     Ortho tri-cyclen lo       MEDICAL RECORD  I have reviewed patient's medical record and pertinent results are listed above.      PHYSICAL EXAM  VITAL SIGNS: /76   Pulse 71   Temp 36.9 °C (98.4 °F)   Resp 18   Ht 1.6 m (5' 3\")   Wt 72.6 kg (160 lb)   BMI 28.34 kg/m²    Constitutional: Well appearing patient in no acute distress.  Not toxic, nor ill in appearance.  HENT: Mucus membranes moist.    Eyes: No scleral icterus. Normal conjunctiva   Neck: Supple, comfortable, nonpainful range of motion.   Cardiovascular: Regular heart rate and rhythm.   Thorax & Lungs: Comfortable nonlabored respirations.  Abdomen: Soft, minimal tenderness overlying the right pelvis, there is no rebound, no guarding and the rest of the abdomen is otherwise nontender. Nondistended.  Skin: Warm, dry. No rash appreciated  Extremities/Musculoskeletal: No sign of trauma. No asymmetric calf tenderness, erythema or edema. Normal range of motion   Neurologic: Alert & oriented. No focal deficits observed.   Psychiatric: Normal affect appropriate for the clinical situation.    DIAGNOSTIC STUDIES / PROCEDURES    LABS  Results for orders placed or performed during the hospital encounter of 12/09/17   URINALYSIS,CULTURE IF INDICATED   Result Value Ref Range    Color Yellow     Character Clear     Specific Gravity 1.008 <1.035    Ph 6.5 5.0 - 8.0    Glucose Negative Negative mg/dL    Ketones Negative Negative mg/dL    Protein Negative Negative mg/dL    Bilirubin Negative Negative    Urobilinogen, Urine 0.2 Negative    Nitrite Negative Negative    Leukocyte Esterase " Negative Negative    Occult Blood Negative Negative    Micro Urine Req see below     Culture Indicated No UA Culture   BETA-HCG QUALITATIVE URINE   Result Value Ref Range    Beta-Hcg Urine Negative Negative   REFRACTOMETER SG   Result Value Ref Range    Specific Gravity 1.010          RADIOLOGY  US-GYN-PELVIS TRANSVAGINAL   Final Result      Small amount of free fluid in the right adnexa and cul-de-sac, likely related to a ruptured cyst.            COURSE & MEDICAL DECISION MAKING  I have reviewed any medical record information, laboratory studies and radiographic results as noted above.  Differential diagnoses includes: Ovarian torsion, ovarian cyst, pregnancy, tubo-ovarian abscess    Encounter Summary: This is a 22 y.o. female with right-sided lower abdominal pelvic pain, sent here to rule out ovarian torsion, [facility] revealed a normal WBC, the patient has not been vomiting, she did minimally nauseated, on exam there is no evidence of peritonitis and I think appendicitis is quite low on the differential at this point. Will obtain an ultrasound of her pelvis to better evaluate the adnexal region. [facility] the physician tells me that there is quite a bit of tenderness without a palpable mass. Will check a urinalysis and ultrasound and then reevaluate ----- ultrasound reveals a small amount of free fluid in the right adnexa which is interpreted as likely related to ruptured cyst. The patient states her pain is actually resolved at this point. I discussed what I will restrict return instructions, stable and appropriate for discharge      DISPOSITION: Discharged home in stable condition      FINAL IMPRESSION  1. Ruptured ovarian cyst           This dictation was created using voice recognition software. The accuracy of the dictation is limited to the abilities of the software. I expect there may be some errors of grammar and possibly content. The nursing notes were reviewed and certain  aspects of this information were incorporated into this note.    Electronically signed by: Klaus Hardin, 12/9/2017 8:03 PM

## 2017-12-10 NOTE — DISCHARGE INSTRUCTIONS
"Return immediately to the Emergency Department if you experience continuing or worsening discomfort in your abdomen, fever, chest pain, difficulty breathing, back pain, or any other new or worsening symptoms.         Ovarian Cyst  An ovarian cyst is a fluid-filled sac that forms on an ovary. The ovaries are small organs that produce eggs in women. Various types of cysts can form on the ovaries. Most are not cancerous. Many do not cause problems, and they often go away on their own. Some may cause symptoms and require treatment. Common types of ovarian cysts include:  · Functional cysts--These cysts may occur every month during the menstrual cycle. This is normal. The cysts usually go away with the next menstrual cycle if the woman does not get pregnant. Usually, there are no symptoms with a functional cyst.  · Endometrioma cysts--These cysts form from the tissue that lines the uterus. They are also called \"chocolate cysts\" because they become filled with blood that turns brown. This type of cyst can cause pain in the lower abdomen during intercourse and with your menstrual period.  · Cystadenoma cysts--This type develops from the cells on the outside of the ovary. These cysts can get very big and cause lower abdomen pain and pain with intercourse. This type of cyst can twist on itself, cut off its blood supply, and cause severe pain. It can also easily rupture and cause a lot of pain.  · Dermoid cysts--This type of cyst is sometimes found in both ovaries. These cysts may contain different kinds of body tissue, such as skin, teeth, hair, or cartilage. They usually do not cause symptoms unless they get very big.  · Theca lutein cysts--These cysts occur when too much of a certain hormone (human chorionic gonadotropin) is produced and overstimulates the ovaries to produce an egg. This is most common after procedures used to assist with the conception of a baby (in vitro fertilization).  CAUSES   · Fertility drugs can " cause a condition in which multiple large cysts are formed on the ovaries. This is called ovarian hyperstimulation syndrome.  · A condition called polycystic ovary syndrome can cause hormonal imbalances that can lead to nonfunctional ovarian cysts.  SIGNS AND SYMPTOMS   Many ovarian cysts do not cause symptoms. If symptoms are present, they may include:  · Pelvic pain or pressure.  · Pain in the lower abdomen.  · Pain during sexual intercourse.  · Increasing girth (swelling) of the abdomen.  · Abnormal menstrual periods.  · Increasing pain with menstrual periods.  · Stopping having menstrual periods without being pregnant.  DIAGNOSIS   These cysts are commonly found during a routine or annual pelvic exam. Tests may be ordered to find out more about the cyst. These tests may include:  · Ultrasound.  · X-ray of the pelvis.  · CT scan.  · MRI.  · Blood tests.  TREATMENT   Many ovarian cysts go away on their own without treatment. Your health care provider may want to check your cyst regularly for 2-3 months to see if it changes. For women in menopause, it is particularly important to monitor a cyst closely because of the higher rate of ovarian cancer in menopausal women. When treatment is needed, it may include any of the following:  · A procedure to drain the cyst (aspiration). This may be done using a long needle and ultrasound. It can also be done through a laparoscopic procedure. This involves using a thin, lighted tube with a tiny camera on the end (laparoscope) inserted through a small incision.  · Surgery to remove the whole cyst. This may be done using laparoscopic surgery or an open surgery involving a larger incision in the lower abdomen.  · Hormone treatment or birth control pills. These methods are sometimes used to help dissolve a cyst.  HOME CARE INSTRUCTIONS   · Only take over-the-counter or prescription medicines as directed by your health care provider.  · Follow up with your health care provider as  directed.  · Get regular pelvic exams and Pap tests.  SEEK MEDICAL CARE IF:   · Your periods are late, irregular, or painful, or they stop.  · Your pelvic pain or abdominal pain does not go away.  · Your abdomen becomes larger or swollen.  · You have pressure on your bladder or trouble emptying your bladder completely.  · You have pain during sexual intercourse.  · You have feelings of fullness, pressure, or discomfort in your stomach.  · You lose weight for no apparent reason.  · You feel generally ill.  · You become constipated.  · You lose your appetite.  · You develop acne.  · You have an increase in body and facial hair.  · You are gaining weight, without changing your exercise and eating habits.  · You think you are pregnant.  SEEK IMMEDIATE MEDICAL CARE IF:   · You have increasing abdominal pain.  · You feel sick to your stomach (nauseous), and you throw up (vomit).  · You develop a fever that comes on suddenly.  · You have abdominal pain during a bowel movement.  · Your menstrual periods become heavier than usual.  MAKE SURE YOU:  · Understand these instructions.  · Will watch your condition.  · Will get help right away if you are not doing well or get worse.     This information is not intended to replace advice given to you by your health care provider. Make sure you discuss any questions you have with your health care provider.     Document Released: 12/18/2006 Document Revised: 12/23/2014 Document Reviewed: 08/25/2014  [employer] Interactive Patient Education ©2016 [employer]

## 2017-12-10 NOTE — ED NOTES
[facility]. Endorses RLQ abdominal pain that began today at noon and has not gone away. A&Ox4.

## 2018-01-16 ENCOUNTER — OFFICE VISIT (OUTPATIENT)
Dept: MEDICAL GROUP | Facility: MEDICAL CENTER | Age: 23
End: 2018-01-16
Payer: OTHER GOVERNMENT

## 2018-01-16 VITALS
BODY MASS INDEX: 27.82 KG/M2 | OXYGEN SATURATION: 96 % | DIASTOLIC BLOOD PRESSURE: 74 MMHG | RESPIRATION RATE: 14 BRPM | HEIGHT: 63 IN | HEART RATE: 89 BPM | WEIGHT: 157 LBS | TEMPERATURE: 99.1 F | SYSTOLIC BLOOD PRESSURE: 120 MMHG

## 2018-01-16 DIAGNOSIS — M19.90 ARTHRITIS: ICD-10-CM

## 2018-01-16 DIAGNOSIS — F41.9 ANXIETY: ICD-10-CM

## 2018-01-16 DIAGNOSIS — F43.21 ADJUSTMENT DISORDER WITH DEPRESSED MOOD: ICD-10-CM

## 2018-01-16 DIAGNOSIS — M51.37 DEGENERATIVE DISC DISEASE AT L5-S1 LEVEL: ICD-10-CM

## 2018-01-16 DIAGNOSIS — M79.7 FIBROMYALGIA: ICD-10-CM

## 2018-01-16 PROBLEM — O09.899 SUPERVISION OF OTHER HIGH RISK PREGNANCY, ANTEPARTUM: Status: RESOLVED | Noted: 2017-02-14 | Resolved: 2018-01-16

## 2018-01-16 PROBLEM — F32.A DEPRESSION: Status: RESOLVED | Noted: 2018-01-16 | Resolved: 2018-01-16

## 2018-01-16 PROBLEM — F32.A DEPRESSION: Status: ACTIVE | Noted: 2018-01-16

## 2018-01-16 PROCEDURE — 99214 OFFICE O/P EST MOD 30 MIN: CPT | Performed by: NURSE PRACTITIONER

## 2018-01-16 RX ORDER — ACETAMINOPHEN AND CODEINE PHOSPHATE 120; 12 MG/5ML; MG/5ML
1 SOLUTION ORAL DAILY
COMMUNITY
End: 2021-07-02

## 2018-01-16 ASSESSMENT — PATIENT HEALTH QUESTIONNAIRE - PHQ9
SUM OF ALL RESPONSES TO PHQ QUESTIONS 1-9: 5
5. POOR APPETITE OR OVEREATING: 0 - NOT AT ALL
CLINICAL INTERPRETATION OF PHQ2 SCORE: 1

## 2018-01-17 NOTE — PROGRESS NOTES
CC: paperwork for [employer]      Rowan Leos is a 22 y.o. female here to establish care and to discuss the evaluation and management of:    1. Fibromyalgia  Patient states that this was diagnosed around 2013. States [facility] and had [facility] out rheumatoid [facility] as one of the diagnosis. States that she has muscle pain mainly like her ankles and calves and her hips. States she tried taking Cymbalta but did not find great relief with that. Also had to stop because she was pregnant and now is nursing.    2. 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 Patient states she did try taking Cymbalta however had to stop because she is pregnant and currently nursing. Not on any medication for this. Currently feels that her mood is stable.     3. Anxiety  Patient states that this is relatively controlled at this time. Not taking any medications as she is nursing.    4. Arthritis  Patient states that her ankles hips back and neck cannot be very painful for her. Sometimes she'll take Tylenol for discomfort or lie down.    5. Degenerative disc disease at L5-S1 level and T8 T9 level  Patient states that she was told she had degenerative disc disease at L5-S1 and at T8 and T9. States she had an x-ray back in November 2017.  Brings the x-rays readings today. She states she'll go and lay down to help relieve some of her pain after she's been up walking and moving around for long periods. Patient states this can be aggravated by the upright for longer than an hour walking around the store, carrying her children are doing house chores. Unable to lift any heavy items or carry any heavy items without discomfort. Denies any loss of bowel or bladder function, states sometimes she will get a pain down the right inner groin down her leg. Patient states he had [facility]. Seeking medical discharge from   due to pain caused by this.    6. High cholesterol  Patient states that she has high cholesterol. Does not recall lab values and has not taken medication for this.      ROS:  Denies any Headache, Blurred Vision, Confusion Chest pain,  Shortness of breath,  Abdominal pain, Changes of bowel or bladder, Lower ext edema, Fevers, Nights sweats, Weight Changes, Focal weakness or numbness.  All other systems are negative.      Current Outpatient Prescriptions:   •  Cholecalciferol (VITAMIN D3) 1000 UNIT/SPRAY Liquid, Take  by mouth., Disp: , Rfl:   •  norethindrone (JOLIVETTE) 0.35 MG tablet, Take 1 Tab by mouth every day., Disp: , Rfl:   •  Cranberry 1000 MG Cap, Take  by mouth., Disp: , Rfl:   •  Prenatal MV-Min-Fe Fum-FA-DHA (PRENATAL 1 PO), Take  by mouth., Disp: , Rfl:     Allergies   Allergen Reactions   • Ortho Tri-Cyclen [Norgestimate-Ethinyl Estradiol] Anaphylaxis     Ortho tri-cyclen lo       Past Medical History:   Diagnosis Date   • Anxiety    • Arthritis    • Depression    • Fibromyalgia    • Headache, classical migraine    • Migraines    • Previous  delivery, antepartum 10/17/2016    Desires TOLAC    • Supervision of other high risk pregnancy, antepartum 2017     Past Surgical History:   Procedure Laterality Date   • REPEAT C SECTION  3/9/2017    Procedure: REPEAT C SECTION;  Surgeon: Hemanth Cisneros M.D.;  Location: LABOR AND DELIVERY;  Service:    • PRIMARY C SECTION  2014    Performed by Rebekah Rodriguez M.D. at LABOR AND DELIVERY   • OTHER ORTHOPEDIC SURGERY       Family History   Problem Relation Age of Onset   • Hypertension Father    • Diabetes Father    • Heart Disease Maternal Grandmother    • Diabetes Maternal Grandmother    • Heart Disease Paternal Grandmother    • Heart Disease Paternal Grandfather      Social History     Social History   • Marital status:      Spouse name: N/A   • Number of children: N/A   • Years of education: N/A     Occupational History   • Not on  "file.     Social History Main Topics   • Smoking status: Former Smoker     Start date: 9/14/2011   • Smokeless tobacco: Never Used   • Alcohol use 0.6 oz/week     1 Glasses of wine per week   • Drug use: No   • Sexual activity: Yes     Birth control/ protection: Other-See Comments      Comment: mini pill     Other Topics Concern   • Not on file     Social History Narrative   • No narrative on file       Objective:     Vitals: /74   Pulse 89   Temp 37.3 °C (99.1 °F)   Resp 14   Ht 1.6 m (5' 3\")   Wt 71.2 kg (157 lb)   LMP 01/01/2018   SpO2 96%   BMI 27.81 kg/m²      General: Alert, pleasant, NAD  HEENT:  Normocephalic.  Neck supple.  No thyromegaly or masses palpated. No cervical or supraclavicular lymphadenopathy.  Heart:  Regular rate and rhythm.  S1 and S2 normal.  No murmurs appreciated.  Respiratory:  Normal respiratory effort.  Clear to auscultation bilaterally.    Skin:  Warm, dry, no rashes  Musculoskeletal:  Gait is normal.  Moves all extremities well. Mild pain when palpating the lower lumbar region.  Extremities: No leg edema.  Neurological: No tremors, sensation grossly intact  Psych:  Affect/mood is normal, judgement is good, memory is intact, grooming is appropriate.      Assessment and Plan.   22 y.o. female to establish and discuss the following    1. Fibromyalgia  Stable. Continue to try to incorporate exercise daily. [facility]    2. Adjustment disorder with depressed mood   3. Anxiety  Stable. Scored 5 on PHQ-9 in clinic today. 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 Does not take any medications as she is breast-feeding at this time. [facility]    4. Arthritis  Chronic. Multiple joints affected. Patient states that she was worked up for rheumatoid arthritis through the [facility] which was negative. Followed by the [facility]    5. Degenerative disc disease at L5-S1 level  Chronic. [facility]. Follow[facility] by the " [facility]:     No Follow-up on file.        Perla PIERSON.

## 2018-04-19 ENCOUNTER — OFFICE VISIT (OUTPATIENT)
Dept: MEDICAL GROUP | Facility: MEDICAL CENTER | Age: 23
End: 2018-04-19
Payer: OTHER GOVERNMENT

## 2018-04-19 VITALS
WEIGHT: 158.2 LBS | DIASTOLIC BLOOD PRESSURE: 72 MMHG | OXYGEN SATURATION: 95 % | SYSTOLIC BLOOD PRESSURE: 118 MMHG | HEART RATE: 82 BPM | RESPIRATION RATE: 14 BRPM | TEMPERATURE: 96.6 F | BODY MASS INDEX: 28.03 KG/M2 | HEIGHT: 63 IN

## 2018-04-19 DIAGNOSIS — M79.7 FIBROMYALGIA: Chronic | ICD-10-CM

## 2018-04-19 DIAGNOSIS — M51.37 DEGENERATIVE DISC DISEASE AT L5-S1 LEVEL: Chronic | ICD-10-CM

## 2018-04-19 PROBLEM — F43.21 ADJUSTMENT DISORDER WITH DEPRESSED MOOD: Chronic | Status: ACTIVE | Noted: 2018-01-16

## 2018-04-19 PROCEDURE — 99212 OFFICE O/P EST SF 10 MIN: CPT | Performed by: NURSE PRACTITIONER

## 2018-04-19 NOTE — PROGRESS NOTES
cc:  Letter for     Subjective:     HPI:     Rowan Leos is a 22 y.o. female here to discuss the evaluation and management of:    1. Degenerative disc disease at L5-S1 level  Patient states that she does have degenerative disc disease at L5 and S1 and at T8 and T9. Has had an x-ray of the spine back in the fall  which state that she does have 18 degree curvature of the levoconvex scoliotic thoracic spine and degeneration at L5-S1. Needs more paperwork filled out for the .    2. Fibromyalgia  Patient states that she has been diagnosed around . Had a workup with the [facility] and they have ruled out rheumatoid arthritis. States that she has muscle pain mainly in her ankles and calves and her hips. Has tried Cymbalta but did not find great relief in that. Needs more paperwork filled out for the .      ROS:  Denies any Headache, Blurred Vision, Confusion Chest pain,  Shortness of breath,  Abdominal pain, Changes of bowel or bladder, Lower ext edema, Fevers, Nights sweats, Weight Changes, Focal weakness or numbness.  All other systems are negative. +Back pain        Current Outpatient Prescriptions:   •  Cholecalciferol (VITAMIN D3) 1000 UNIT/SPRAY Liquid, Take  by mouth., Disp: , Rfl:   •  norethindrone (JOLIVETTE) 0.35 MG tablet, Take 1 Tab by mouth every day., Disp: , Rfl:   •  Cranberry 1000 MG Cap, Take  by mouth., Disp: , Rfl:   •  Prenatal MV-Min-Fe Fum-FA-DHA (PRENATAL 1 PO), Take  by mouth., Disp: , Rfl:     Allergies   Allergen Reactions   • Ortho Tri-Cyclen [Norgestimate-Ethinyl Estradiol] Anaphylaxis     Ortho tri-cyclen lo       Past Medical History:   Diagnosis Date   • Anxiety    • Arthritis    • Depression    • Fibromyalgia    • Headache, classical migraine    • Migraines    • Previous  delivery, antepartum 10/17/2016    Desires TOLAC    • Supervision of other high risk pregnancy, antepartum 2017     Past Surgical History:   Procedure Laterality Date   •  "REPEAT C SECTION  3/9/2017    Procedure: REPEAT C SECTION;  Surgeon: Hemanth Cisneros M.D.;  Location: LABOR AND DELIVERY;  Service:    • PRIMARY C SECTION  9/14/2014    Performed by Rebekah Rodriguez M.D. at LABOR AND DELIVERY   • OTHER ORTHOPEDIC SURGERY       Family History   Problem Relation Age of Onset   • Hypertension Father    • Diabetes Father    • Heart Disease Maternal Grandmother    • Diabetes Maternal Grandmother    • Heart Disease Paternal Grandmother    • Heart Disease Paternal Grandfather      Social History     Social History   • Marital status:      Spouse name: N/A   • Number of children: N/A   • Years of education: N/A     Occupational History   • Not on file.     Social History Main Topics   • Smoking status: Former Smoker     Start date: 9/14/2011   • Smokeless tobacco: Never Used   • Alcohol use 0.6 oz/week     1 Glasses of wine per week   • Drug use: No   • Sexual activity: Yes     Birth control/ protection: Other-See Comments      Comment: mini pill     Other Topics Concern   • Not on file     Social History Narrative   • No narrative on file       Objective:     Vitals: /72   Pulse 82   Temp 35.9 °C (96.6 °F)   Resp 14   Ht 1.6 m (5' 3\")   Wt 71.8 kg (158 lb 3.2 oz)   LMP 04/12/2018   SpO2 95%   BMI 28.02 kg/m²    General: Alert, pleasant, NAD  HEENT: Normocephalic.  Heart: Regular rate and rhythm.  S1 and S2 normal.  No murmurs appreciated.  Skin: Warm, dry, no rashes.  Musculoskeletal: Gait is normal.   Extremities: No leg edema.     Neurological: No tremors, sensation grossly intact  Psych:  Affect/mood is normal, judgement is good, memory is intact, grooming is appropriate.    Assessment/Plan:     Rowan WILKES was seen today for letter for school/work.    Diagnoses and all orders for this visit:    Degenerative disc disease at L5-S1 level  Continue to work on stretching, exercises, heat and using Tylenol for discomfort area to avoid heavy lifting pushing or pulling. Continue " to participate in physical therapy. [facility]    Fibromyalgia  Continued to exercise daily, have good nutrition such as whole grains, fruits, vegetables. [facility].         Health care Maintenance:     No Follow-up on file.        Perla PIERSON.

## 2018-06-26 ENCOUNTER — TELEPHONE (OUTPATIENT)
Dept: MEDICAL GROUP | Facility: MEDICAL CENTER | Age: 23
End: 2018-06-26

## 2018-06-26 NOTE — TELEPHONE ENCOUNTER
LVM for pt regarding paperwork for the [employer] on how she wanted form filled out. Pt did not answer will try again later.

## 2018-07-11 ENCOUNTER — TELEPHONE (OUTPATIENT)
Dept: MEDICAL GROUP | Facility: MEDICAL CENTER | Age: 23
End: 2018-07-11

## 2018-07-11 NOTE — TELEPHONE ENCOUNTER
----- Message from TULIO Johnson sent at 7/11/2018 12:34 PM PDT -----  Regarding: FW: Non-Urgent Medical Question  Contact: [phone]  Did you call her and let her know it is ready. Thanks.  ----- Message -----  From: Stefano Shi Ass't  Sent: 7/6/2018   1:05 PM  To: TULIO Johnson  Subject: FW: Non-Urgent Medical Question                      ----- Message -----  From: Rowan Leos  Sent: 7/6/2018  11:56 AM  To: Tess Mcmullen Clinical Staff  Subject: Non-Urgent Medical Question                      Good Morning!    Sorry it took so long to respond. I forgot to drop off the blank paperwork to be copied over with the alterations.    It will not let me attach it here. Can you please email me at the email below so that I can send you the blank document?    Thank you again for all of your time and efforts, it is beyond appreciated. Don't hesitate to call if you need any additional info!    Rowan Leos  [phone]  [email or web address]

## 2018-08-08 ENCOUNTER — OFFICE VISIT (OUTPATIENT)
Dept: MEDICAL GROUP | Facility: MEDICAL CENTER | Age: 23
End: 2018-08-08
Payer: OTHER GOVERNMENT

## 2018-08-08 VITALS
DIASTOLIC BLOOD PRESSURE: 70 MMHG | OXYGEN SATURATION: 94 % | WEIGHT: 161 LBS | SYSTOLIC BLOOD PRESSURE: 120 MMHG | RESPIRATION RATE: 16 BRPM | BODY MASS INDEX: 28.53 KG/M2 | TEMPERATURE: 97.5 F | HEART RATE: 68 BPM | HEIGHT: 63 IN

## 2018-08-08 DIAGNOSIS — M19.90 ARTHRITIS: Chronic | ICD-10-CM

## 2018-08-08 DIAGNOSIS — M51.37 DEGENERATIVE DISC DISEASE AT L5-S1 LEVEL: Chronic | ICD-10-CM

## 2018-08-08 PROCEDURE — 99213 OFFICE O/P EST LOW 20 MIN: CPT | Performed by: NURSE PRACTITIONER

## 2018-08-08 NOTE — PROGRESS NOTES
cc: Paperwork for the       Subjective:     HPI:     Rowan Leos is a 23 y.o. female here to discuss the evaluation and management of:    Back pain and joint pain  Patient continues to have back pain and joint pain that does limit her throughout the day with activities.  Has been using ice, rest, Motrin and Tylenol as well as heat.  States that it is worse in the mornings and then throughout the day gets lipid better however worsens towards the end of the day.  States he recently just had a MRI however does not have the results at this point.  Requesting referral to spine specialist.  Needs additional paperwork filled out for medical discharge from the  due to pain.        ROS:  Denies any Headache, Blurred Vision, Confusion, Chest pain,  Shortness of breath,  Abdominal pain, Changes of bowel or bladder, Lower ext edema, Fevers, Nights sweats, Weight Changes, Focal weakness or numbness.  All other systems are negative.  Back pain, knee pain, hip pain        Current Outpatient Prescriptions:   •  Cholecalciferol (VITAMIN D3) 1000 UNIT/SPRAY Liquid, Take  by mouth., Disp: , Rfl:   •  norethindrone (JOLIVETTE) 0.35 MG tablet, Take 1 Tab by mouth every day., Disp: , Rfl:   •  Cranberry 1000 MG Cap, Take  by mouth., Disp: , Rfl:   •  Prenatal MV-Min-Fe Fum-FA-DHA (PRENATAL 1 PO), Take  by mouth., Disp: , Rfl:     Allergies   Allergen Reactions   • Ortho Tri-Cyclen [Norgestimate-Ethinyl Estradiol] Anaphylaxis     Ortho tri-cyclen lo       Past Medical History:   Diagnosis Date   • Anxiety    • Arthritis    • Depression    • Fibromyalgia    • Headache, classical migraine    • Migraines    • Previous  delivery, antepartum 10/17/2016    Desires TOLAC    • Supervision of other high risk pregnancy, antepartum 2017     Past Surgical History:   Procedure Laterality Date   • REPEAT C SECTION  3/9/2017    Procedure: REPEAT C SECTION;  Surgeon: Hemanth Cisneros M.D.;  Location: LABOR AND DELIVERY;   "Service:    • PRIMARY C SECTION  9/14/2014    Performed by Rebekah Rodriguez M.D. at LABOR AND DELIVERY   • OTHER ORTHOPEDIC SURGERY       Family History   Problem Relation Age of Onset   • Hypertension Father    • Diabetes Father    • Heart Disease Maternal Grandmother    • Diabetes Maternal Grandmother    • Heart Disease Paternal Grandmother    • Heart Disease Paternal Grandfather      Social History     Social History   • Marital status:      Spouse name: N/A   • Number of children: N/A   • Years of education: N/A     Occupational History   • Not on file.     Social History Main Topics   • Smoking status: Former Smoker     Start date: 9/14/2011   • Smokeless tobacco: Never Used   • Alcohol use 0.6 oz/week     1 Glasses of wine per week   • Drug use: No   • Sexual activity: Yes     Birth control/ protection: Other-See Comments      Comment: mini pill     Other Topics Concern   • Not on file     Social History Narrative   • No narrative on file       Objective:     Vitals: /70   Pulse 68   Temp 36.4 °C (97.5 °F)   Resp 16   Ht 1.6 m (5' 3\")   Wt 73 kg (161 lb)   SpO2 94%   BMI 28.52 kg/m²    General: Alert, pleasant, NAD  HEENT: Normocephalic.  Skin: Warm, dry, no rashes.  Musculoskeletal: Gait is normal.  Moves all extremities well.  Extremities: No leg edema.    Neurological: No tremors, sensation grossly intact, gait is normal,   Psych:  Affect/mood is normal, judgement is good, memory is intact, grooming is appropriate.    Assessment/Plan:     Rowan WILKES was seen today for letter for school/work.    Diagnoses and all orders for this visit:    Degenerative disc disease at L5-S1 level  Chronic.  Continue with stretching, ice, heat, exercise and referral over to neurosurgery for further  symptom management.  Paperwork filled out for the .  -     REFERRAL TO NEUROSURGERY    Arthritis  -     REFERRAL TO NEUROSURGERY           Health care Maintenance:     Return in about 3 months (around " 11/8/2018).          Perla ANTUNEZ

## 2021-07-02 ENCOUNTER — OFFICE VISIT (OUTPATIENT)
Dept: URGENT CARE | Facility: PHYSICIAN GROUP | Age: 26
End: 2021-07-02
Payer: OTHER GOVERNMENT

## 2021-07-02 VITALS
DIASTOLIC BLOOD PRESSURE: 84 MMHG | WEIGHT: 145 LBS | SYSTOLIC BLOOD PRESSURE: 134 MMHG | HEIGHT: 62 IN | RESPIRATION RATE: 16 BRPM | OXYGEN SATURATION: 96 % | HEART RATE: 120 BPM | TEMPERATURE: 99.4 F | BODY MASS INDEX: 26.68 KG/M2

## 2021-07-02 DIAGNOSIS — H16.002 CORNEAL ULCERATION, LEFT: ICD-10-CM

## 2021-07-02 PROCEDURE — 99203 OFFICE O/P NEW LOW 30 MIN: CPT | Performed by: PHYSICIAN ASSISTANT

## 2021-07-02 RX ORDER — BACLOFEN 10 MG/1
10 TABLET ORAL
COMMUNITY
End: 2023-01-12

## 2021-07-02 RX ORDER — IBUPROFEN 400 MG/1
400 TABLET ORAL EVERY 6 HOURS PRN
COMMUNITY

## 2021-07-02 RX ORDER — ERYTHROMYCIN 5 MG/G
OINTMENT OPHTHALMIC
Qty: 3.5 G | Refills: 0 | Status: SHIPPED | OUTPATIENT
Start: 2021-07-02 | End: 2023-01-12

## 2021-07-02 RX ORDER — FLUOXETINE HYDROCHLORIDE 20 MG/1
20 CAPSULE ORAL
COMMUNITY
End: 2023-01-12

## 2021-07-02 RX ORDER — GABAPENTIN 800 MG/1
900 TABLET ORAL 3 TIMES DAILY
COMMUNITY
End: 2023-01-12

## 2021-07-02 ASSESSMENT — ENCOUNTER SYMPTOMS
EYE DISCHARGE: 0
NAUSEA: 0
EYE REDNESS: 0
CHILLS: 0
EYE PAIN: 1
BLURRED VISION: 0
DOUBLE VISION: 0
DIZZINESS: 0
FEVER: 0
PHOTOPHOBIA: 1
HEADACHES: 0
VOMITING: 0

## 2021-07-03 NOTE — PROGRESS NOTES
Subjective:   Rowan Leos is a 25 y.o. female who presents for Eye Problem (xToday, Pt states something in eye, swollen, itches, red, painful )      HPI  25 y.o. female presents to urgent care with new problem to provider of left eye pain and sensation of foreign body onset a few hours prior to arrival. Patient wears contacts lenses and does have hx of wearing them for extending periods of time and sleeping in them. She denies specific injury to her eye.  She denies recent exposure to metal or wood shavings.  Patient has been cleaning her house recently and may have gotten something into her eye, however she is uncertain.  Denies discharge.  No visual acuity changes. Denies other associated aggravating or alleviating factors.     Review of Systems   Constitutional: Negative for chills and fever.   Eyes: Positive for photophobia and pain. Negative for blurred vision, double vision, discharge and redness.   Gastrointestinal: Negative for nausea and vomiting.   Neurological: Negative for dizziness and headaches.       Patient Active Problem List   Diagnosis   • Fibromyalgia   • Arthritis   • Hx of migraine headaches   • Degenerative disc disease at L5-S1 level   • Anxiety   • Adjustment disorder with depressed mood     Past Surgical History:   Procedure Laterality Date   • REPEAT C SECTION  3/9/2017    Procedure: REPEAT C SECTION;  Surgeon: Hemanth Cisneros M.D.;  Location: LABOR AND DELIVERY;  Service:    • PRIMARY C SECTION  9/14/2014    Performed by Rebekah Rodriguez M.D. at LABOR AND DELIVERY   • OTHER ORTHOPEDIC SURGERY       Social History     Tobacco Use   • Smoking status: Current Every Day Smoker     Start date: 9/14/2011   • Smokeless tobacco: Never Used   • Tobacco comment: vape   Substance Use Topics   • Alcohol use: Yes     Alcohol/week: 0.6 oz     Types: 1 Glasses of wine per week     Comment: socially   • Drug use: No      Family History   Problem Relation Age of Onset   • Hypertension Father    • Diabetes  "Father    • Heart Disease Maternal Grandmother    • Diabetes Maternal Grandmother    • Heart Disease Paternal Grandmother    • Heart Disease Paternal Grandfather       (Allergies, Medications, & Tobacco/Substance Use were reconciled by the Medical Assistant and reviewed by myself. The family history is prepopulated)     Objective:     /84   Pulse (!) 120   Temp 37.4 °C (99.4 °F) (Temporal)   Resp 16   Ht 1.575 m (5' 2\")   Wt 65.8 kg (145 lb)   SpO2 96%   BMI 26.52 kg/m²     Physical Exam  Vitals reviewed.   Constitutional:       Appearance: Normal appearance. She is well-developed.   HENT:      Head: Normocephalic and atraumatic.   Eyes:      General:         Left eye: No discharge.      Extraocular Movements: Extraocular movements intact.      Pupils: Pupils are equal, round, and reactive to light.      Left eye: Fluorescein uptake present.        Comments: Mild left upper eyelid edema and minimal left conjunctival injection   Cardiovascular:      Rate and Rhythm: Regular rhythm. Tachycardia present.   Pulmonary:      Effort: Pulmonary effort is normal. No respiratory distress.   Musculoskeletal:      Cervical back: Normal range of motion and neck supple.   Skin:     General: Skin is warm and dry.   Neurological:      General: No focal deficit present.      Mental Status: She is alert and oriented to person, place, and time.   Psychiatric:         Mood and Affect: Mood normal.         Behavior: Behavior normal.         Thought Content: Thought content normal.         Judgment: Judgment normal.         Assessment/Plan:     1. Corneal ulceration, left  erythromycin 5 MG/GM Ointment     Corneal abrasion seen on fluorescein exam most likely due to patient wearing her contacts for extended period of time and overnight.  Patient should not wear contacts until her symptoms have completely resolved.  She should follow-up with optometry for development of visual acuity changes.  Discussed red flags and return " precautions.  Differential diagnosis, natural history, supportive care, and indications for immediate follow-up discussed.    Advised the patient to follow-up with the primary care physician for recheck, reevaluation, and consideration of further management.  Patient verbalized understanding of treatment plan and has no further questions regarding care.     I personally reviewed prior external notes and test results pertinent to today's visit.     Please note that this dictation was created using voice recognition software. I have made a reasonable attempt to correct obvious errors, but I expect that there are errors of grammar and possibly content that I did not discover before finalizing the note.    This note was electronically signed by Heydi Meyer PA-C

## 2022-11-29 ENCOUNTER — NON-PROVIDER VISIT (OUTPATIENT)
Dept: OCCUPATIONAL MEDICINE | Facility: CLINIC | Age: 27
End: 2022-11-29
Payer: OTHER GOVERNMENT

## 2022-11-29 DIAGNOSIS — Z02.1 PRE-EMPLOYMENT DRUG SCREENING: ICD-10-CM

## 2022-11-29 LAB
AMP AMPHETAMINE: NORMAL
COC COCAINE: NORMAL
INT CON NEG: NORMAL
INT CON POS: NORMAL
MET METHAMPHETAMINES: NORMAL
OPI OPIATES: NORMAL
PCP PHENCYCLIDINE: NORMAL
POC DRUG COMMENT 753798-OCCUPATIONAL HEALTH: NEGATIVE
THC: NORMAL

## 2022-11-29 PROCEDURE — 80305 DRUG TEST PRSMV DIR OPT OBS: CPT | Performed by: NURSE PRACTITIONER

## 2022-12-27 ENCOUNTER — OFFICE VISIT (OUTPATIENT)
Dept: URGENT CARE | Facility: CLINIC | Age: 27
End: 2022-12-27
Payer: COMMERCIAL

## 2022-12-27 VITALS
DIASTOLIC BLOOD PRESSURE: 60 MMHG | OXYGEN SATURATION: 94 % | TEMPERATURE: 98.4 F | HEIGHT: 62 IN | SYSTOLIC BLOOD PRESSURE: 118 MMHG | BODY MASS INDEX: 30.49 KG/M2 | RESPIRATION RATE: 14 BRPM | WEIGHT: 165.7 LBS | HEART RATE: 74 BPM

## 2022-12-27 DIAGNOSIS — B34.9 ACUTE VIRAL SYNDROME: ICD-10-CM

## 2022-12-27 LAB
INT CON NEG: NORMAL
INT CON POS: NORMAL
POC URINE PREGNANCY TEST: NEGATIVE

## 2022-12-27 PROCEDURE — 81025 URINE PREGNANCY TEST: CPT | Performed by: STUDENT IN AN ORGANIZED HEALTH CARE EDUCATION/TRAINING PROGRAM

## 2022-12-27 PROCEDURE — 99213 OFFICE O/P EST LOW 20 MIN: CPT | Performed by: STUDENT IN AN ORGANIZED HEALTH CARE EDUCATION/TRAINING PROGRAM

## 2022-12-27 NOTE — LETTER
December 27, 2022       Patient: Rowan Leos   YOB: 1995   Date of Visit: 12/27/2022         To Whom It May Concern:    Rowan Leos was seen in urgent care today and is cleared to return to work on 12/29/2022.    If you have any questions or concerns, please don't hesitate to call [phone]          Sincerely,          Dr Emiliano Horn D.O.  Electronically Signed

## 2022-12-28 NOTE — PROGRESS NOTES
Subjective:   CHIEF COMPLAINT  Chief Complaint   Patient presents with    Nausea     N/V/D, chills x 4am today       HPI  Rowan Leos is a 27 y.o. female who presents with a chief complaint of nausea, vomiting, diarrhea and chills.  Says symptoms started at 0400 this morning.  Says she had approximately 2 episodes of emesis this morning and 1-2 bouts of diarrhea but has had no further vomiting or diarrhea this afternoon.  She has not taken any medication for symptomatic relief.  No overt abdominal pain.  No sore throat, earache, cough or fevers.  Requesting a note for work.    REVIEW OF SYSTEMS  General: no fever or chills  GI: Admits nausea and vomiting  See HPI for further details.    PAST MEDICAL HISTORY  Patient Active Problem List    Diagnosis Date Noted    Degenerative disc disease at L5-S1 level 01/16/2018    Anxiety 01/16/2018    Adjustment disorder with depressed mood 01/16/2018    Fibromyalgia 09/03/2014    Arthritis 09/03/2014    Hx of migraine headaches 09/03/2014       SURGICAL HISTORY   has a past surgical history that includes other orthopedic surgery; primary c section (9/14/2014); and repeat c section (3/9/2017).    ALLERGIES  Allergies   Allergen Reactions    Ortho Tri-Cyclen [Norgestimate-Ethinyl Estradiol] Anaphylaxis     Ortho tri-cyclen lo       CURRENT MEDICATIONS  Home Medications       Reviewed by Emiliano Horn D.O. (Physician) on 12/27/22 at 2113  Med List Status: <None>     Medication Last Dose Status   baclofen (LIORESAL) 10 MG Tab Not Taking Active   Cholecalciferol (VITAMIN D3) 1000 UNIT/SPRAY Liquid Taking Active   Cranberry 1000 MG Cap Not Taking Active   erythromycin 5 MG/GM Ointment Not Taking Active   FLUoxetine (PROZAC) 20 MG Cap Not Taking Active   gabapentin (NEURONTIN) 800 MG tablet Not Taking Active   ibuprofen (MOTRIN) 400 MG Tab PRN Active                    SOCIAL HISTORY  Social History     Tobacco Use    Smoking status: Every Day     Types: Cigarettes     Start  "date: 9/14/2011    Smokeless tobacco: Never    Tobacco comments:     vape   Vaping Use    Vaping Use: Every day   Substance and Sexual Activity    Alcohol use: Yes     Alcohol/week: 0.6 oz     Types: 1 Glasses of wine per week     Comment: socially    Drug use: No    Sexual activity: Yes     Birth control/protection: Other-See Comments     Comment: mini pill       FAMILY HISTORY  Family History   Problem Relation Age of Onset    Hypertension Father     Diabetes Father     Heart Disease Maternal Grandmother     Diabetes Maternal Grandmother     Heart Disease Paternal Grandmother     Heart Disease Paternal Grandfather           Objective:   PHYSICAL EXAM  VITAL SIGNS: /60 (BP Location: Left arm)   Pulse 74   Temp 36.9 °C (98.4 °F) (Temporal)   Resp 14   Ht 1.575 m (5' 2\") Comment: per pt  Wt 75.2 kg (165 lb 11.2 oz)   SpO2 94%   BMI 30.31 kg/m²     Gen: no acute distress, normal voice  Skin: dry, intact, moist mucosal membranes  Lungs: CTAB w/ symmetric expansion  CV: RRR w/o murmurs or clicks  Abdomen: Bowel sounds normal, soft, no tenderness, rebound or guarding.   Psych: normal affect, normal judgement, alert, awake    hCG: Negative    Assessment/Plan:     1. Acute viral syndrome  POCT PREGNANCY      Likely viral etiology and self-limiting.  Examination is reassuring.  No red flags.  Recommended continuation of fluid hydration.  Provided a note for work.  Return to urgent care any new/worsening symptoms or further questions or concerns.  Patient understood everything discussed.  All questions were answered.      Differential diagnosis, natural history, supportive care, and indications for immediate follow-up discussed. All questions answered. Patient agrees with the plan of care.    Follow-up as needed if symptoms worsen or fail to improve to PCP, Urgent care or Emergency Room.    Please note that this dictation was created using voice recognition software. I have made a reasonable attempt to correct " obvious errors, but I expect that there are errors of grammar and possibly content that I did not discover before finalizing the note.

## 2023-01-12 ENCOUNTER — OFFICE VISIT (OUTPATIENT)
Dept: URGENT CARE | Facility: CLINIC | Age: 28
End: 2023-01-12
Payer: COMMERCIAL

## 2023-01-12 ENCOUNTER — HOSPITAL ENCOUNTER (OUTPATIENT)
Facility: MEDICAL CENTER | Age: 28
End: 2023-01-12
Attending: NURSE PRACTITIONER
Payer: COMMERCIAL

## 2023-01-12 VITALS
RESPIRATION RATE: 16 BRPM | HEART RATE: 118 BPM | TEMPERATURE: 97.8 F | BODY MASS INDEX: 30.8 KG/M2 | SYSTOLIC BLOOD PRESSURE: 138 MMHG | OXYGEN SATURATION: 98 % | DIASTOLIC BLOOD PRESSURE: 86 MMHG | WEIGHT: 167.4 LBS | HEIGHT: 62 IN

## 2023-01-12 DIAGNOSIS — R52 BODY ACHES: ICD-10-CM

## 2023-01-12 DIAGNOSIS — R11.0 NAUSEA: ICD-10-CM

## 2023-01-12 DIAGNOSIS — J06.9 VIRAL URI: ICD-10-CM

## 2023-01-12 PROCEDURE — 99213 OFFICE O/P EST LOW 20 MIN: CPT | Performed by: NURSE PRACTITIONER

## 2023-01-12 PROCEDURE — 0240U HCHG SARS-COV-2 COVID-19 NFCT DS RESP RNA 3 TRGT MIC: CPT

## 2023-01-12 RX ORDER — ONDANSETRON 4 MG/1
4 TABLET, ORALLY DISINTEGRATING ORAL EVERY 8 HOURS PRN
Qty: 10 TABLET | Refills: 0 | Status: SHIPPED | OUTPATIENT
Start: 2023-01-12

## 2023-01-12 ASSESSMENT — ENCOUNTER SYMPTOMS
HEADACHES: 1
NAUSEA: 1
DIARRHEA: 1
SHORTNESS OF BREATH: 0
SORE THROAT: 0
VOMITING: 0
RHINORRHEA: 0
SINUS PAIN: 0
COUGH: 0
ABDOMINAL PAIN: 0

## 2023-01-12 NOTE — LETTER
January 12, 2023         Patient: Rowan Leos   YOB: 1995   Date of Visit: 1/12/2023           To Whom it May Concern:     Your employee was seen in our clinic today.  A concern for COVID-19 has been identified and testing is in progress.     We are asking you to excuse absences while following self-isolation protocol per Center for Disease Control (CDC) guidelines.  Your employee will be able to access test results through our electronic delivery system called [employer].     If the results of testing are negative, and once there has been no fever (temperature >100.4 F) for at least 72 hours without treatment, and no vomiting or diarrhea for at least 48 hours, then return to work is approved.    If the results of testing are POSITIVE, your employee should follow the CDC guidelines.  These are isolations for a minimum of 5 days and at least 24 hours have passed since your last fever without the use of a fever-reducing medication and all other systems have improved.  In addition, it is recommended you wear a mask for an additional 5 days to the health department may contact you and provide further directions regarding self-isolation and return to work.    In general, repeat testing is not necessary and not offered through our [facility].     This is the only note that will be provided from [facility] for this visit.  Your employee will require an appointment with a primary care provider if FMLA or disability forms are required.         If you have any questions please do not hesitate to call me at the phone number listed below.    Sincerely,      Royal Mcgrath APRCHRISTIANO  [phone]

## 2023-01-13 DIAGNOSIS — R52 BODY ACHES: ICD-10-CM

## 2023-01-13 DIAGNOSIS — J06.9 VIRAL URI: ICD-10-CM

## 2023-01-13 DIAGNOSIS — R11.0 NAUSEA: ICD-10-CM

## 2023-01-13 LAB
FLUAV RNA SPEC QL NAA+PROBE: NEGATIVE
FLUBV RNA SPEC QL NAA+PROBE: NEGATIVE
SARS-COV-2 RNA RESP QL NAA+PROBE: NOTDETECTED
SPECIMEN SOURCE: NORMAL

## 2023-01-13 NOTE — PROGRESS NOTES
Subjective:   Rowan Leos is a 27 y.o. female who presents for Diarrhea (Nausea, headache x today)      URI   This is a new problem. The current episode started today. The problem has been unchanged. The maximum temperature recorded prior to her arrival was 100.4 - 100.9 F. The fever has been present for Less than 1 day. Associated symptoms include diarrhea, headaches and nausea. Pertinent negatives include no abdominal pain, congestion, coughing, ear pain, rhinorrhea, sinus pain, sore throat or vomiting. She has tried acetaminophen and sleep for the symptoms. The treatment provided no relief.     Review of Systems   HENT:  Negative for congestion, ear discharge, ear pain, rhinorrhea, sinus pain and sore throat.    Respiratory:  Negative for cough and shortness of breath.    Gastrointestinal:  Positive for diarrhea and nausea. Negative for abdominal pain and vomiting.   Neurological:  Positive for headaches.     Medications:    baclofen Tabs  Cranberry Caps  erythromycin Oint  FLUoxetine Caps  gabapentin  ibuprofen Tabs  ondansetron Tbdp  Vitamin D3 Liqd    Allergies: Ortho tri-cyclen [norgestimate-ethinyl estradiol]    Problem List: Rowan Leos does not have any pertinent problems on file.    Surgical History:  Past Surgical History:   Procedure Laterality Date    REPEAT C SECTION  3/9/2017    Procedure: REPEAT C SECTION;  Surgeon: Hemanth Cisneros M.D.;  Location: [facility];  Service:     PRIMARY C SECTION  9/14/2014    Performed by Rebekah Rodriguez M.D. at [facility]    OTHER ORTHOPEDIC SURGERY         Past Social Hx: Rowan Leos  reports that she has been smoking cigarettes. She started smoking about 11 years ago. She has never used smokeless tobacco. She reports current alcohol use of about 0.6 oz per week. She reports that she does not use drugs.     Past Family Hx:  Rowan Leos family history includes Diabetes in her father and maternal grandmother; Heart Disease in her maternal  "grandmother, paternal grandfather, and paternal grandmother; Hypertension in her father.     Problem list, medications, and allergies reviewed by myself today in Epic.     Objective:     /86   Pulse (!) 118   Temp 36.6 °C (97.8 °F) (Temporal)   Resp 16   Ht 1.575 m (5' 2\")   Wt 75.9 kg (167 lb 6.4 oz)   SpO2 98%   BMI 30.62 kg/m²     Physical Exam  Vitals and nursing note reviewed.   Constitutional:       General: She is not in acute distress.     Appearance: She is well-developed.   HENT:      Head: Normocephalic and atraumatic.      Right Ear: External ear normal.      Left Ear: External ear normal.      Nose: Nose normal.      Mouth/Throat:      Mouth: Mucous membranes are moist.      Tonsils: No tonsillar exudate.   Eyes:      Conjunctiva/sclera: Conjunctivae normal.   Pulmonary:      Effort: Pulmonary effort is normal. No respiratory distress.      Breath sounds: Normal breath sounds.   Abdominal:      General: There is no distension.   Musculoskeletal:         General: Normal range of motion.   Skin:     General: Skin is warm and dry.   Neurological:      General: No focal deficit present.      Mental Status: She is alert and oriented to person, place, and time. Mental status is at baseline.      Gait: Gait (gait at baseline) normal.   Psychiatric:         Judgment: Judgment normal.       Assessment/Plan:     Diagnosis and associated orders:     1. Viral URI  ondansetron (ZOFRAN ODT) 4 MG TABLET DISPERSIBLE    CoV-2 and Flu A/B by PCR (24 hour In-House): Collect NP swab in VTM      2. Nausea  ondansetron (ZOFRAN ODT) 4 MG TABLET DISPERSIBLE    CoV-2 and Flu A/B by PCR (24 hour In-House): Collect NP swab in VTM      3. Body aches  ondansetron (ZOFRAN ODT) 4 MG TABLET DISPERSIBLE    CoV-2 and Flu A/B by PCR (24 hour In-House): Collect NP swab in VTM         Comments/MDM:     The patient's presenting symptoms and exam findings most likely are due to a viral etiology.     Test for COVID-19 via PCR. " Result will be reviewed by myself. We will call/message back for results and appropriate further instructions. Instructed to sign up for [employer] if they have not already. Result will be automatically released to [employer] application for patient review. I will be sending a message with Next Step Instructions to [employer] soon after resulted.   Symptomatic and supportive care:   Plenty of oral hydration and rest   Over the counter cough suppressant as directed.  Tylenol or ibuprofen for pain and fever as directed.   Warm salt water gargles for sore throat, soft foods, cool liquids.   Saline nasal spray and Flonase  Infection control measures at home. Stay away from people, Hand washing, covering sneeze/cough, disinfect surfaces.   Remain home from work, school, and other populated environments. Work note provided with information of quarantine measures per CDC guidelines.   Overall, the patient is well-appearing. They are not hypoxic, afebrile, and a normal pulmonary exam.      Differential diagnosis, natural history, supportive care, and indications for immediate follow-up discussed.              Please note that this dictation was created using voice recognition software. I have made a reasonable attempt to correct obvious errors, but I expect that there are errors of grammar and possibly content that I did not discover before finalizing the note.    This note was electronically signed by Royal WOODS.

## 2023-07-31 ENCOUNTER — HOSPITAL ENCOUNTER (EMERGENCY)
Facility: MEDICAL CENTER | Age: 28
End: 2023-07-31
Attending: EMERGENCY MEDICINE
Payer: COMMERCIAL

## 2023-07-31 VITALS
RESPIRATION RATE: 20 BRPM | TEMPERATURE: 97.5 F | HEIGHT: 62 IN | SYSTOLIC BLOOD PRESSURE: 114 MMHG | OXYGEN SATURATION: 97 % | HEART RATE: 61 BPM | DIASTOLIC BLOOD PRESSURE: 58 MMHG | WEIGHT: 185 LBS | BODY MASS INDEX: 34.04 KG/M2

## 2023-07-31 DIAGNOSIS — H16.9 KERATITIS: ICD-10-CM

## 2023-07-31 DIAGNOSIS — H20.9 IRITIS: ICD-10-CM

## 2023-07-31 PROCEDURE — 99284 EMERGENCY DEPT VISIT MOD MDM: CPT

## 2023-07-31 PROCEDURE — 700102 HCHG RX REV CODE 250 W/ 637 OVERRIDE(OP): Mod: UD | Performed by: EMERGENCY MEDICINE

## 2023-07-31 PROCEDURE — 700101 HCHG RX REV CODE 250: Mod: UD | Performed by: EMERGENCY MEDICINE

## 2023-07-31 PROCEDURE — A9270 NON-COVERED ITEM OR SERVICE: HCPCS | Mod: UD | Performed by: EMERGENCY MEDICINE

## 2023-07-31 RX ORDER — BENOXINATE HCL/FLUORESCEIN SOD 0.4%-0.25%
2 DROPS OPHTHALMIC (EYE) ONCE
Status: DISCONTINUED | OUTPATIENT
Start: 2023-07-31 | End: 2023-08-01 | Stop reason: HOSPADM

## 2023-07-31 RX ORDER — CYCLOPENTOLATE HYDROCHLORIDE 10 MG/ML
1 SOLUTION/ DROPS OPHTHALMIC 2 TIMES DAILY
Qty: 5 ML | Refills: 0 | Status: SHIPPED | OUTPATIENT
Start: 2023-07-31 | End: 2023-08-05

## 2023-07-31 RX ORDER — ARTIFICIAL TEARS 1; 2; 3 MG/ML; MG/ML; MG/ML
1 SOLUTION/ DROPS OPHTHALMIC
Qty: 15 ML | Refills: 0 | Status: SHIPPED | OUTPATIENT
Start: 2023-07-31 | End: 2023-08-05

## 2023-07-31 RX ORDER — CARBOXYMETHYLCELLULOSE SODIUM 5 MG/ML
1 SOLUTION/ DROPS OPHTHALMIC PRN
Status: DISCONTINUED | OUTPATIENT
Start: 2023-07-31 | End: 2023-08-01 | Stop reason: HOSPADM

## 2023-07-31 RX ORDER — ERYTHROMYCIN 5 MG/G
1 OINTMENT OPHTHALMIC ONCE
Status: COMPLETED | OUTPATIENT
Start: 2023-07-31 | End: 2023-07-31

## 2023-07-31 RX ORDER — ERYTHROMYCIN 5 MG/G
1 OINTMENT OPHTHALMIC 4 TIMES DAILY
Qty: 3.5 G | Refills: 0 | Status: ACTIVE | OUTPATIENT
Start: 2023-07-31 | End: 2023-08-05

## 2023-07-31 RX ORDER — PROPARACAINE HYDROCHLORIDE 5 MG/ML
1 SOLUTION/ DROPS OPHTHALMIC ONCE
Status: COMPLETED | OUTPATIENT
Start: 2023-07-31 | End: 2023-07-31

## 2023-07-31 RX ORDER — CYCLOPENTOLATE HYDROCHLORIDE 10 MG/ML
1 SOLUTION/ DROPS OPHTHALMIC ONCE
Status: COMPLETED | OUTPATIENT
Start: 2023-07-31 | End: 2023-07-31

## 2023-07-31 RX ADMIN — CYCLOPENTOLATE HYDROCHLORIDE 1 DROP: 10 SOLUTION OPHTHALMIC at 21:45

## 2023-07-31 RX ADMIN — PROPARACAINE HYDROCHLORIDE 1 DROP: 5 SOLUTION/ DROPS OPHTHALMIC at 21:00

## 2023-07-31 RX ADMIN — ERYTHROMYCIN 1 APPLICATION: 5 OINTMENT OPHTHALMIC at 22:34

## 2023-07-31 RX ADMIN — CARBOXYMETHYLCELLULOSE SODIUM 1 DROP: 5 SOLUTION/ DROPS OPHTHALMIC at 22:33

## 2023-07-31 RX ADMIN — FLUORESCEIN SODIUM 1 MG: 1 STRIP OPHTHALMIC at 21:00

## 2023-08-01 NOTE — ED TRIAGE NOTES
"Chief Complaint   Patient presents with    Eye Pain     Right eye. [facility]. Pt states she wore her contacts for 2 days and developed pain and drainage in the right eye upon removal of contacts. Left eye painful and sensitive to sunlight.        /77   Pulse 69   Temp 36.7 °C (98 °F) (Temporal)   Resp 20   Ht 1.575 m (5' 2\")   Wt 83.9 kg (185 lb)   SpO2 97%   BMI 33.84 kg/m²     "

## 2023-08-01 NOTE — ED PROVIDER NOTES
ED Provider Note    CHIEF COMPLAINT  Chief Complaint   Patient presents with    Eye Pain     Right eye. [facility]. Pt states she wore her contacts for 2 days and developed pain and drainage in the right eye upon removal of contacts. Left eye painful and sensitive to sunlight.        EXTERNAL RECORDS REVIEWED  Outpatient Notes urgent care visit 2023 for URI    HPI/ROS  LIMITATION TO HISTORY   Select: : None  OUTSIDE HISTORIAN(S):  None    Rowan Leos is a 28 y.o. female who [facility].  Patient chronically wears contacts.  She did leave them in for prolonged period time and her right eye then became irritated.  She has not worn her contact for least a day but she continues to have increasing pain especially with photophobia and even range of motion of eye.  She also notes pain in the right eye when her left eye is exposed to light.  [facility] work[facility]up.    [facility] work[facility]up to include visual acuities, ocular pressures and slit-lamp exam.  All of this was largely benign other than the ongoing photophobia and pain.    PAST MEDICAL HISTORY   has a past medical history of Anxiety, Arthritis, Depression, Fibromyalgia, Headache, classical migraine, Migraines, Previous  delivery, antepartum (10/17/2016), and Supervision of other high risk pregnancy, antepartum (2017).    SURGICAL HISTORY   has a past surgical history that includes other orthopedic surgery; primary c section (2014); and repeat c section (3/9/2017).    FAMILY HISTORY  Family History   Problem Relation Age of Onset    Hypertension Father     Diabetes Father     Heart Disease Maternal Grandmother     Diabetes Maternal Grandmother     Heart Disease Paternal Grandmother     Heart Disease Paternal Grandfather        SOCIAL HISTORY  Social History     Tobacco Use    Smoking status: Every Day     Types: Cigarettes      "Start date: 9/14/2011    Smokeless tobacco: Never    Tobacco comments:     vape   Vaping Use    Vaping Use: Every day   Substance and Sexual Activity    Alcohol use: Yes     Alcohol/week: 0.6 oz     Types: 1 Glasses of wine per week     Comment: socially    Drug use: No    Sexual activity: Yes     Birth control/protection: Other-See Comments     Comment: mini pill       CURRENT MEDICATIONS  Home Medications       Reviewed by Deepti Aguayo R.N. (Registered Nurse) on 07/31/23 at 2026  Med List Status: Not Addressed     Medication Last Dose Status   Cholecalciferol (VITAMIN D3) 1000 UNIT/SPRAY Liquid  Active   ibuprofen (MOTRIN) 400 MG Tab  Active   ondansetron (ZOFRAN ODT) 4 MG TABLET DISPERSIBLE  Active                    ALLERGIES  Allergies   Allergen Reactions    Ortho Tri-Cyclen [Norgestimate-Ethinyl Estradiol] Anaphylaxis     Ortho tri-cyclen lo       PHYSICAL EXAM  VITAL SIGNS: /58   Pulse 61   Temp 36.4 °C (97.5 °F) (Temporal)   Resp 20   Ht 1.575 m (5' 2\")   Wt 83.9 kg (185 lb)   SpO2 97%   BMI 33.84 kg/m²      Pulse ox interpretation: I interpret this pulse ox as normal.  Constitutional: Alert in no apparent distress.  HENT: No signs of trauma, Bilateral external ears normal, Nose normal.   Eyes: Pupils are equal and reactive.  Right eye: Injected.  Slit lamp exam is benign.  No anterior cell or flare or hyphema or hypopyon.  No corneal abrasion identified.  Intraocular pressure of 11 on [employer] tonometry.  No evidence of cellulitis.  Abdomen: Bowel sounds normal, Soft, No tenderness, No masses, No pulsatile masses. No peritoneal signs.  Skin: Warm, Dry, No erythema, No rash.   Musculoskeletal: Good range of motion in all major joints.  Neurologic: Alert , Normal motor function, Normal sensory function, No focal deficits noted.   Psychiatric: Affect normal, Judgment normal, Mood normal.         COURSE & MEDICAL DECISION MAKING    ED Observation Status? No; Patient does not meet criteria for " ED Observation.     INITIAL ASSESSMENT, COURSE AND PLAN  Care Narrative: Patient presenting for ophthalmologic consultation.  We will proceed with repeat ocular physical exam and ophthalmologic consultation as needed  DISPOSITION AND DISCUSSIONS  I have discussed management of the patient with the following physicians and BARBARA's: Dr. VIVAS    Discussion of management with other [facility] or appropriate source(s): Pharmacy for medication verification      Escalation of care considered, and ultimately not performed:blood analysis, diagnostic imaging, and acute inpatient care management, however at this time, the patient is most appropriate for outpatient management    Barriers to care at this time, including but not limited to:  Will have prompt ophthalmologic follow-up .     Patient presenting the emergency department with above presentation.  I discussed the case with ophthalmologist on-call.  They recommend artificial tears every 1 hour, erythromycin ointment 4 times daily and cyclopentolate twice daily.  Patient to be seen in the ophthalmologic office on Wednesday.    Patient is having today's evaluation, findings and plan.  She will continue to refrain from use of contacts.  We will continue with pain management as prescribed as well as over-the-counter medications discussed.  Will return with any change or worsening.    FINAL DIAGNOSIS  1. Keratitis    2. Iritis           Electronically signed by: John Hatfield M.D., 7/31/2023 9:07 PM

## 2023-08-01 NOTE — ED NOTES
Discharge instructions given to pt. Pt well understood. All questions have been addressed. Discharged pt in stable condition.

## 2025-06-11 NOTE — CARE PLAN
Problem: Altered physiologic condition related to postoperative  delivery  Goal: Patient physiologically stable as evidenced by normal lochia, palpable uterine involution and vital signs within normal limits  Outcome: PROGRESSING AS EXPECTED  Patient's fundus is firm, lochia light and vital signs within defined limits. Patient educated on appropriate amounts of bleeding and when to notify RN/MD, patient verbalized understanding.     Problem: Potential for postpartum infection related to surgical incision, compromised uterine condition, urinary tract or respiratory compromise  Goal: Patient will be afebrile and free from signs and symptoms of infection  Outcome: PROGRESSING AS EXPECTED  Patient has no signs/symptoms of infection, afebrile. Patient educated about signs/symptoms of infection and when to notify MD, patient verbalized understanding.          no breast tenderness L/no breast tenderness R/no breast lump L/no breast lump R

## (undated) DEVICE — 0 CHROMIC CT-1

## (undated) DEVICE — CATHETER IV NON-SAFETY 18 GA X 1 1/4 (50/BX 4BX/CA)

## (undated) DEVICE — GLOVE, BIOGEL ECLIPSE, SZ 7.0, PF LTX (50/BX)

## (undated) DEVICE — SODIUM CHL IRRIGATION 0.9% 1000ML (12EA/CA)

## (undated) DEVICE — STAPLER SKIN DISP - (6/BX 10BX/CA) VISISTAT

## (undated) DEVICE — HEAD HOLDER JUNIOR/ADULT

## (undated) DEVICE — SET EXTENSION WITH 2 PORTS (48EA/CA) ***PART #2C8610 IS A SUBSTITUTE*****

## (undated) DEVICE — PACK C-SECTION (2EA/CA)

## (undated) DEVICE — KIT  I.V. START (100EA/CA)

## (undated) DEVICE — SUTURE 0 VICRYL PLUS CT-1 - 36 INCH (36/BX)

## (undated) DEVICE — DETERGENT RENUZYME PLUS 10 OZ PACKET (50/BX)

## (undated) DEVICE — TUBING CLEARLINK DUO-VENT - C-FLO (48EA/CA)

## (undated) DEVICE — PAD LAP STERILE 18 X 18 - (5/PK 40PK/CA)

## (undated) DEVICE — TRAY SPINAL ANESTHESIA NON-SAFETY (10/CA)

## (undated) DEVICE — WATER IRRIG. STER. 1500 ML - (9/CA)

## (undated) DEVICE — ELECTRODE DUAL RETURN W/ CORD - (50/PK)